# Patient Record
Sex: FEMALE | Race: BLACK OR AFRICAN AMERICAN | NOT HISPANIC OR LATINO | URBAN - METROPOLITAN AREA
[De-identification: names, ages, dates, MRNs, and addresses within clinical notes are randomized per-mention and may not be internally consistent; named-entity substitution may affect disease eponyms.]

---

## 2018-10-29 ENCOUNTER — INPATIENT (INPATIENT)
Facility: HOSPITAL | Age: 78
LOS: 3 days | Discharge: HOME | End: 2018-11-02
Attending: INTERNAL MEDICINE | Admitting: INTERNAL MEDICINE
Payer: COMMERCIAL

## 2018-10-29 VITALS
RESPIRATION RATE: 16 BRPM | DIASTOLIC BLOOD PRESSURE: 88 MMHG | TEMPERATURE: 99 F | OXYGEN SATURATION: 95 % | SYSTOLIC BLOOD PRESSURE: 174 MMHG | HEART RATE: 112 BPM

## 2018-10-29 DIAGNOSIS — Z72.0 TOBACCO USE: ICD-10-CM

## 2018-10-29 DIAGNOSIS — J44.9 CHRONIC OBSTRUCTIVE PULMONARY DISEASE, UNSPECIFIED: ICD-10-CM

## 2018-10-29 DIAGNOSIS — I16.0 HYPERTENSIVE URGENCY: ICD-10-CM

## 2018-10-29 DIAGNOSIS — Z29.9 ENCOUNTER FOR PROPHYLACTIC MEASURES, UNSPECIFIED: ICD-10-CM

## 2018-10-29 DIAGNOSIS — J18.9 PNEUMONIA, UNSPECIFIED ORGANISM: ICD-10-CM

## 2018-10-29 DIAGNOSIS — I50.9 HEART FAILURE, UNSPECIFIED: ICD-10-CM

## 2018-10-29 DIAGNOSIS — I50.21 ACUTE SYSTOLIC (CONGESTIVE) HEART FAILURE: ICD-10-CM

## 2018-10-29 LAB
ALBUMIN SERPL ELPH-MCNC: 3.2 G/DL — LOW (ref 3.3–5)
ALP SERPL-CCNC: 54 U/L — SIGNIFICANT CHANGE UP (ref 40–120)
ALT FLD-CCNC: 13 U/L — SIGNIFICANT CHANGE UP (ref 4–33)
AST SERPL-CCNC: 31 U/L — SIGNIFICANT CHANGE UP (ref 4–32)
BASOPHILS # BLD AUTO: 0.03 K/UL — SIGNIFICANT CHANGE UP (ref 0–0.2)
BASOPHILS NFR BLD AUTO: 0.5 % — SIGNIFICANT CHANGE UP (ref 0–2)
BILIRUB SERPL-MCNC: 0.3 MG/DL — SIGNIFICANT CHANGE UP (ref 0.2–1.2)
BUN SERPL-MCNC: 14 MG/DL — SIGNIFICANT CHANGE UP (ref 7–23)
CALCIUM SERPL-MCNC: 9.2 MG/DL — SIGNIFICANT CHANGE UP (ref 8.4–10.5)
CHLORIDE SERPL-SCNC: 92 MMOL/L — LOW (ref 98–107)
CO2 SERPL-SCNC: 32 MMOL/L — HIGH (ref 22–31)
CREAT SERPL-MCNC: 0.54 MG/DL — SIGNIFICANT CHANGE UP (ref 0.5–1.3)
EOSINOPHIL # BLD AUTO: 0.02 K/UL — SIGNIFICANT CHANGE UP (ref 0–0.5)
EOSINOPHIL NFR BLD AUTO: 0.3 % — SIGNIFICANT CHANGE UP (ref 0–6)
GLUCOSE SERPL-MCNC: 86 MG/DL — SIGNIFICANT CHANGE UP (ref 70–99)
HCT VFR BLD CALC: 39.1 % — SIGNIFICANT CHANGE UP (ref 34.5–45)
HGB BLD-MCNC: 12.5 G/DL — SIGNIFICANT CHANGE UP (ref 11.5–15.5)
IMM GRANULOCYTES # BLD AUTO: 0.02 # — SIGNIFICANT CHANGE UP
IMM GRANULOCYTES NFR BLD AUTO: 0.3 % — SIGNIFICANT CHANGE UP (ref 0–1.5)
LYMPHOCYTES # BLD AUTO: 1.01 K/UL — SIGNIFICANT CHANGE UP (ref 1–3.3)
LYMPHOCYTES # BLD AUTO: 16.5 % — SIGNIFICANT CHANGE UP (ref 13–44)
MCHC RBC-ENTMCNC: 29.3 PG — SIGNIFICANT CHANGE UP (ref 27–34)
MCHC RBC-ENTMCNC: 32 % — SIGNIFICANT CHANGE UP (ref 32–36)
MCV RBC AUTO: 91.6 FL — SIGNIFICANT CHANGE UP (ref 80–100)
MONOCYTES # BLD AUTO: 0.46 K/UL — SIGNIFICANT CHANGE UP (ref 0–0.9)
MONOCYTES NFR BLD AUTO: 7.5 % — SIGNIFICANT CHANGE UP (ref 2–14)
NEUTROPHILS # BLD AUTO: 4.58 K/UL — SIGNIFICANT CHANGE UP (ref 1.8–7.4)
NEUTROPHILS NFR BLD AUTO: 74.9 % — SIGNIFICANT CHANGE UP (ref 43–77)
NRBC # FLD: 0 — SIGNIFICANT CHANGE UP
NT-PROBNP SERPL-SCNC: 2293 PG/ML — SIGNIFICANT CHANGE UP
PLATELET # BLD AUTO: 222 K/UL — SIGNIFICANT CHANGE UP (ref 150–400)
PMV BLD: 9.5 FL — SIGNIFICANT CHANGE UP (ref 7–13)
POTASSIUM SERPL-MCNC: 5.3 MMOL/L — SIGNIFICANT CHANGE UP (ref 3.5–5.3)
POTASSIUM SERPL-SCNC: 5.3 MMOL/L — SIGNIFICANT CHANGE UP (ref 3.5–5.3)
PROT SERPL-MCNC: 6.9 G/DL — SIGNIFICANT CHANGE UP (ref 6–8.3)
RBC # BLD: 4.27 M/UL — SIGNIFICANT CHANGE UP (ref 3.8–5.2)
RBC # FLD: 14.4 % — SIGNIFICANT CHANGE UP (ref 10.3–14.5)
SODIUM SERPL-SCNC: 133 MMOL/L — LOW (ref 135–145)
TROPONIN T, HIGH SENSITIVITY: 21 NG/L — SIGNIFICANT CHANGE UP (ref ?–14)
TROPONIN T, HIGH SENSITIVITY: 24 NG/L — SIGNIFICANT CHANGE UP (ref ?–14)
WBC # BLD: 6.12 K/UL — SIGNIFICANT CHANGE UP (ref 3.8–10.5)
WBC # FLD AUTO: 6.12 K/UL — SIGNIFICANT CHANGE UP (ref 3.8–10.5)

## 2018-10-29 PROCEDURE — 93970 EXTREMITY STUDY: CPT | Mod: 26

## 2018-10-29 PROCEDURE — 71046 X-RAY EXAM CHEST 2 VIEWS: CPT | Mod: 26

## 2018-10-29 PROCEDURE — 99223 1ST HOSP IP/OBS HIGH 75: CPT

## 2018-10-29 PROCEDURE — 93306 TTE W/DOPPLER COMPLETE: CPT | Mod: 26

## 2018-10-29 PROCEDURE — 71275 CT ANGIOGRAPHY CHEST: CPT | Mod: 26

## 2018-10-29 RX ORDER — BUDESONIDE AND FORMOTEROL FUMARATE DIHYDRATE 160; 4.5 UG/1; UG/1
2 AEROSOL RESPIRATORY (INHALATION)
Qty: 0 | Refills: 0 | Status: DISCONTINUED | OUTPATIENT
Start: 2018-10-29 | End: 2018-11-02

## 2018-10-29 RX ORDER — HYDRALAZINE HCL 50 MG
25 TABLET ORAL EVERY 8 HOURS
Qty: 0 | Refills: 0 | Status: DISCONTINUED | OUTPATIENT
Start: 2018-10-29 | End: 2018-11-02

## 2018-10-29 RX ORDER — CEFTRIAXONE 500 MG/1
1 INJECTION, POWDER, FOR SOLUTION INTRAMUSCULAR; INTRAVENOUS EVERY 24 HOURS
Qty: 0 | Refills: 0 | Status: DISCONTINUED | OUTPATIENT
Start: 2018-10-30 | End: 2018-11-02

## 2018-10-29 RX ORDER — LEVALBUTEROL 1.25 MG/.5ML
0.63 SOLUTION, CONCENTRATE RESPIRATORY (INHALATION) EVERY 6 HOURS
Qty: 0 | Refills: 0 | Status: DISCONTINUED | OUTPATIENT
Start: 2018-10-29 | End: 2018-11-02

## 2018-10-29 RX ORDER — CEFTRIAXONE 500 MG/1
1 INJECTION, POWDER, FOR SOLUTION INTRAMUSCULAR; INTRAVENOUS ONCE
Qty: 0 | Refills: 0 | Status: COMPLETED | OUTPATIENT
Start: 2018-10-29 | End: 2018-10-29

## 2018-10-29 RX ORDER — LISINOPRIL 2.5 MG/1
2.5 TABLET ORAL DAILY
Qty: 0 | Refills: 0 | Status: DISCONTINUED | OUTPATIENT
Start: 2018-10-29 | End: 2018-10-29

## 2018-10-29 RX ORDER — FUROSEMIDE 40 MG
40 TABLET ORAL EVERY 12 HOURS
Qty: 0 | Refills: 0 | Status: DISCONTINUED | OUTPATIENT
Start: 2018-10-29 | End: 2018-10-31

## 2018-10-29 RX ORDER — ENOXAPARIN SODIUM 100 MG/ML
40 INJECTION SUBCUTANEOUS EVERY 24 HOURS
Qty: 0 | Refills: 0 | Status: DISCONTINUED | OUTPATIENT
Start: 2018-10-29 | End: 2018-11-02

## 2018-10-29 RX ORDER — AZITHROMYCIN 500 MG/1
500 TABLET, FILM COATED ORAL ONCE
Qty: 0 | Refills: 0 | Status: COMPLETED | OUTPATIENT
Start: 2018-10-29 | End: 2018-10-29

## 2018-10-29 RX ORDER — FUROSEMIDE 40 MG
40 TABLET ORAL ONCE
Qty: 0 | Refills: 0 | Status: COMPLETED | OUTPATIENT
Start: 2018-10-29 | End: 2018-10-29

## 2018-10-29 RX ORDER — LANOLIN ALCOHOL/MO/W.PET/CERES
3 CREAM (GRAM) TOPICAL ONCE
Qty: 0 | Refills: 0 | Status: COMPLETED | OUTPATIENT
Start: 2018-10-29 | End: 2018-10-29

## 2018-10-29 RX ORDER — ASPIRIN/CALCIUM CARB/MAGNESIUM 324 MG
81 TABLET ORAL DAILY
Qty: 0 | Refills: 0 | Status: DISCONTINUED | OUTPATIENT
Start: 2018-10-29 | End: 2018-11-02

## 2018-10-29 RX ORDER — AZITHROMYCIN 500 MG/1
500 TABLET, FILM COATED ORAL EVERY 24 HOURS
Qty: 0 | Refills: 0 | Status: DISCONTINUED | OUTPATIENT
Start: 2018-10-30 | End: 2018-11-01

## 2018-10-29 RX ORDER — ALBUTEROL 90 UG/1
1.25 AEROSOL, METERED ORAL EVERY 6 HOURS
Qty: 0 | Refills: 0 | Status: DISCONTINUED | OUTPATIENT
Start: 2018-10-29 | End: 2018-10-29

## 2018-10-29 RX ORDER — CEFTRIAXONE 500 MG/1
INJECTION, POWDER, FOR SOLUTION INTRAMUSCULAR; INTRAVENOUS
Qty: 0 | Refills: 0 | Status: DISCONTINUED | OUTPATIENT
Start: 2018-10-29 | End: 2018-11-02

## 2018-10-29 RX ORDER — AZITHROMYCIN 500 MG/1
TABLET, FILM COATED ORAL
Qty: 0 | Refills: 0 | Status: DISCONTINUED | OUTPATIENT
Start: 2018-10-29 | End: 2018-11-01

## 2018-10-29 RX ORDER — LISINOPRIL 2.5 MG/1
10 TABLET ORAL DAILY
Qty: 0 | Refills: 0 | Status: DISCONTINUED | OUTPATIENT
Start: 2018-10-29 | End: 2018-11-02

## 2018-10-29 RX ORDER — HYDRALAZINE HCL 50 MG
5 TABLET ORAL ONCE
Qty: 0 | Refills: 0 | Status: COMPLETED | OUTPATIENT
Start: 2018-10-29 | End: 2018-10-29

## 2018-10-29 RX ORDER — NICOTINE POLACRILEX 2 MG
1 GUM BUCCAL DAILY
Qty: 0 | Refills: 0 | Status: DISCONTINUED | OUTPATIENT
Start: 2018-10-29 | End: 2018-11-02

## 2018-10-29 RX ORDER — TIOTROPIUM BROMIDE 18 UG/1
1 CAPSULE ORAL; RESPIRATORY (INHALATION) DAILY
Qty: 0 | Refills: 0 | Status: DISCONTINUED | OUTPATIENT
Start: 2018-10-29 | End: 2018-11-02

## 2018-10-29 RX ADMIN — Medication 25 MILLIGRAM(S): at 12:04

## 2018-10-29 RX ADMIN — BUDESONIDE AND FORMOTEROL FUMARATE DIHYDRATE 2 PUFF(S): 160; 4.5 AEROSOL RESPIRATORY (INHALATION) at 21:59

## 2018-10-29 RX ADMIN — Medication 1 PATCH: at 12:51

## 2018-10-29 RX ADMIN — Medication 5 MILLIGRAM(S): at 07:16

## 2018-10-29 RX ADMIN — CEFTRIAXONE 100 GRAM(S): 500 INJECTION, POWDER, FOR SOLUTION INTRAMUSCULAR; INTRAVENOUS at 19:51

## 2018-10-29 RX ADMIN — ENOXAPARIN SODIUM 40 MILLIGRAM(S): 100 INJECTION SUBCUTANEOUS at 18:21

## 2018-10-29 RX ADMIN — AZITHROMYCIN 250 MILLIGRAM(S): 500 TABLET, FILM COATED ORAL at 18:21

## 2018-10-29 RX ADMIN — Medication 25 MILLIGRAM(S): at 21:58

## 2018-10-29 RX ADMIN — Medication 40 MILLIGRAM(S): at 04:01

## 2018-10-29 RX ADMIN — Medication 3 MILLIGRAM(S): at 21:58

## 2018-10-29 RX ADMIN — LISINOPRIL 10 MILLIGRAM(S): 2.5 TABLET ORAL at 14:48

## 2018-10-29 RX ADMIN — Medication 81 MILLIGRAM(S): at 12:51

## 2018-10-29 RX ADMIN — Medication 40 MILLIGRAM(S): at 18:22

## 2018-10-29 RX ADMIN — Medication 1 PATCH: at 19:22

## 2018-10-29 NOTE — H&P ADULT - HISTORY OF PRESENT ILLNESS
77 y/o female with a PMHx of tobacco abuse but otherwise no significant PMHx presents to ED with dyspnea on exertion for the past several days. Pt reports worsening shortness of breath on exertion for several days, progressing to the point where she is short of breath on minimal exertion. Pt describes it as feeling like she is breathing heavy and can't catch her breath. Pt reports that her shortness of breath is better with rest. Pt also endorses new onset paroxysmal nocturnal dyspnea, orthopnea and lower extremity edema. Pt has been coughing for a couple of days, which is described as a dry, non-productive cough. Pt has been sleeping upright recently because she feels short of breath laying down. Since her   last year of lung cancer, she has decreased appetite and has lost weight, but she cannot quantify. She has over 50 pack year history of smoking and she quit 2 days ago. Pt does not recall a previous cardiac workup. Pt denies fever, chills, recent travel, headache, dizziness, visual deficits, chest pain, palpitations, abdominal pain, N/V/D/C, hematochezia, melena, dysuria, hematuria, LOC, syncope. Upon arrival to ED, EKG: Sinus tachycardia at 106 bpm with PVCs. CE x1: Trop 21. Na: 133. ProBNP: 2293. Pt was given Lasix and admitted to telemetry.

## 2018-10-29 NOTE — CONSULT NOTE ADULT - ASSESSMENT
79 y/o female with a PMHx of tobacco abuse but otherwise no significant PMHx presents to ED with dyspnea on exertion, orthopnea, and lower extremity edema in the setting of acute systolic heart failure.

## 2018-10-29 NOTE — ED PROVIDER NOTE - ATTENDING CONTRIBUTION TO CARE
Attending Note (Annie): patient complaining of shortness of breath and leg swelling. reports sleeping at increased incline.  3+pitting edema to tibial tuberosity.  +rales bilateral bases.  concern for new onset chf. labs, cxr, ekg.

## 2018-10-29 NOTE — H&P ADULT - NSHPLABSRESULTS_GEN_ALL_CORE
EKG: Sinus tachycardia at 106 bpm with PVCs  CE x1: Trop 21  Na: 133  ProBNP: 2293    10/29 Echo: EF 45%. Mitral annular calcification, thickened mitral valve leaflets. Mild-moderate mitral regurgitation. Calcified trileaflet aortic valve with normal opening. Mildly dilated left atrium. LA volume index = 36 cc/m2. Severe concentric left ventricular hypertrophy. Moderate segmental left ventricular systolic dysfunction. The inferior and inferolateral walls are hypokinetic. Normal right ventricular size and function. Normal tricuspid valve. Mild tricuspid regurgitation. Estimated pulmonary artery systolic pressure equals 64 mm Hg, assuming right atrial pressure equals 10 mm Hg, consistent with severe pulmonary hypertension.

## 2018-10-29 NOTE — H&P ADULT - NEGATIVE GASTROINTESTINAL SYMPTOMS
no change in bowel habits/no constipation/no diarrhea/no vomiting/no flatulence/no abdominal pain/no nausea/no hematochezia/no melena

## 2018-10-29 NOTE — ED ADULT TRIAGE NOTE - CHIEF COMPLAINT QUOTE
Appears comfortable and in no apparent distress.  Experiencing SOB and lightheadedness with activity for several days, worst day.  No chest pain, weakness or fever.  Respirations equal and unlabored, speaking in full sentences.

## 2018-10-29 NOTE — ED PROVIDER NOTE - MEDICAL DECISION MAKING DETAILS
77 yo F presenting with increasing CARTER, orthopnea and b/l leg swelling, likely 2/2 CHF, no hx dx- cbc, cmp, trop, ekg, bnp, lasix, tba for new dx CHF

## 2018-10-29 NOTE — H&P ADULT - NEGATIVE NEUROLOGICAL SYMPTOMS
no tremors/no vertigo/no loss of sensation/no syncope/no headache/no difficulty walking/no paresthesias/no confusion/no focal seizures/no transient paralysis/no hemiparesis/no loss of consciousness/no weakness/no generalized seizures

## 2018-10-29 NOTE — CONSULT NOTE ADULT - SUBJECTIVE AND OBJECTIVE BOX
Patient is a 78y old  Female who presents with a chief complaint of Shortness of breath (29 Oct 2018 11:45)      HPI:  79 y/o female with a PMHx of tobacco abuse but otherwise no significant PMHx presents to ED with dyspnea on exertion for the past several days. Pt reports worsening shortness of breath on exertion for several days, progressing to the point where she is short of breath on minimal exertion. Pt describes it as feeling like she is breathing heavy and can't catch her breath. Pt reports that her shortness of breath is better with rest. Pt also endorses new onset paroxysmal nocturnal dyspnea, orthopnea and lower extremity edema. Pt has been coughing for a couple of days, which is described as a dry, non-productive cough. Pt has been sleeping upright recently because she feels short of breath laying down. Since her   last year of lung cancer, she has decreased appetite and has lost weight, but she cannot quantify. She has over 50 pack year history of smoking and she quit 2 days ago. Pt does not recall a previous cardiac workup. Pt denies fever, chills, recent travel, headache, dizziness, visual deficits, chest pain, palpitations, abdominal pain, N/V/D/C, hematochezia, melena, dysuria, hematuria, LOC, syncope. Upon arrival to ED, EKG: Sinus tachycardia at 106 bpm with PVCs. CE x1: Trop 21. Na: 133. ProBNP: 2293. Pt was given Lasix and admitted to telemetry. (29 Oct 2018 11:45)    she smoked for more then 50 years and quit day before yesterday She has also lost significant amount of weight in last many months as she was under lot of stress secondary to her husbands illness: Currently she is not SOB but gets dyspneic on walking      ?FOLLOWING PRESENT  [ x] Hx of PE/DVT, [ x] Hx COPD, [ x] Hx of Asthma, [x ] Hx of Hospitalization, [x ]  Hx of BiPAP/CPAP use, x ] Hx of KATJA    Allergies    No Known Allergies    Intolerances        PAST MEDICAL & SURGICAL HISTORY:  Tobacco abuse  No significant past surgical history      FAMILY HISTORY:  No pertinent family history in first degree relatives      Social History: [> 50 years: 50 pk quit 2 dyas ago  ] TOBACCO                  [ x ] ETOH                                 [x  ] IVDA/DRUGS    REVIEW OF SYSTEMS      General:	x    Skin/Breast:x  	  Ophthalmologic:x  	  ENMT:	x    Respiratory and Thorax: sob, lal, cough   	  Cardiovascular:	x    Gastrointestinal:	x    Genitourinary:	x    Musculoskeletal:	x    Neurological:	x    Psychiatric:	x    Hematology/Lymphatics:	x    Endocrine:	x    Allergic/Immunologic:	    MEDICATIONS  (STANDING):  aspirin enteric coated 81 milliGRAM(s) Oral daily  azithromycin  IVPB      azithromycin  IVPB 500 milliGRAM(s) IV Intermittent once  cefTRIAXone   IVPB 1 Gram(s) IV Intermittent once  cefTRIAXone   IVPB      enoxaparin Injectable 40 milliGRAM(s) SubCutaneous every 24 hours  furosemide   Injectable 40 milliGRAM(s) IV Push every 12 hours  hydrALAZINE 25 milliGRAM(s) Oral every 8 hours  lisinopril 10 milliGRAM(s) Oral daily  nicotine -  14 mG/24Hr(s) Patch 1 patch Transdermal daily    MEDICATIONS  (PRN):  levalbuterol Inhalation 0.63 milliGRAM(s) Inhalation every 6 hours PRN wheezness/short of breath       Vital Signs Last 24 Hrs  T(C): 36.6 (29 Oct 2018 12:01), Max: 37.2 (29 Oct 2018 08:43)  T(F): 97.9 (29 Oct 2018 12:01), Max: 98.9 (29 Oct 2018 08:43)  HR: 103 (29 Oct 2018 14:47) (68 - 112)  BP: 152/71 (29 Oct 2018 14:47) (152/71 - 225/98)  BP(mean): --  RR: 18 (29 Oct 2018 12:01) (16 - 18)  SpO2: 95% (29 Oct 2018 12:01) (95% - 100%)        I&O's Summary      Physical Exam:   GENERAL: NAD, well-groomed, well-developed  HEENT: BALTA/   Atraumatic, Normocephalic  ENMT: No tonsillar erythema, exudates, or enlargement; Moist mucous membranes, Good dentition, No lesions  NECK: Supple, No JVD, Normal thyroid  CHEST/LUNG: Poor air entry bilaterally  CVS: Regular rate and rhythm; No murmurs, rubs, or gallops  GI: : Soft, Nontender, Nondistended; Bowel sounds present  NERVOUS SYSTEM:  Alert & Oriented X3  EXTREMITIES:  2+ edema  LYMPH: No lymphadenopathy noted  SKIN: No rashes or lesions  ENDOCRINOLOGY: No Thyromegaly  PSYCH: Appropriate    Labs:                              12.5   6.12  )-----------( 222      ( 29 Oct 2018 02:22 )             39.1     10-29    133<L>  |  92<L>  |  14  ----------------------------<  86  5.3   |  32<H>  |  0.54    Ca    9.2      29 Oct 2018 02:22    TPro  6.9  /  Alb  3.2<L>  /  TBili  0.3  /  DBili  x   /  AST  31  /  ALT  13  /  AlkPhos  54  10-29    CAPILLARY BLOOD GLUCOSE        LIVER FUNCTIONS - ( 29 Oct 2018 02:22 )  Alb: 3.2 g/dL / Pro: 6.9 g/dL / ALK PHOS: 54 u/L / ALT: 13 u/L / AST: 31 u/L / GGT: x               D DImer  Serum Pro-Brain Natriuretic Peptide: 2293 pg/mL (10-29 @ 02:22)      Studies  Chest X-RAY  CT SCAN Chest   CT Abdomen  Venous Dopplers: LE:   Others    < from: CT Angio Chest w/ IV Cont (10.29.18 @ 12:26) >  edema. There is mild dilatation of the esophagus.    Evaluation of the upper abdominal organs demonstrate subcentimeter   hepatic hypodensities which are too small to characterize.    Evaluation of the lung parenchyma demonstrate emphysema. There are left   lower lobe areas of linear or subsegmental atelectasis. There are patchy   and nodular consolidations within the right lower lobe most confluent at   the right lung base. There is a cluster of tree-in-bud opacities or 2 mm   nodules within the central aspect of the right middle lobe. There are   secretions within the trachea. There is complete opacification of the   lower portion of the bronchus intermedius related to secretions and/or   debris extending into the right lower lobe bronchus and some of its   segmental and subsegmental branches. There is contiguous complete   opacification of the proximal segment of the right middle lobe bronchus.   Secretions and or debris are also noted within the left main stem   bronchus.    Evaluation of the bone demonstrate degenerative changes of the spine and   the shoulders.    IMPRESSION: Right lung base nodular and patchy consolidations likely   infectious in etiology. Opacification of the right lower lung central   airways as described above related to secretions and or debris. The   constellation of findings may represent aspiration pneumonia given the   distribution. A 1-3 month follow-up CT is recommended to ensure   resolution.    Emphysema.    Cardiomegaly possibly associated with left ventricular hypertrophy.   Coronary artery atherosclerotic disease.                  PERLA CHAND M.D. ATTENDING RADIOLOGIST  This document has been electronically signed. Oct 29 2018 12:44PM    < end of copied text >

## 2018-10-29 NOTE — ED PROVIDER NOTE - OBJECTIVE STATEMENT
79 yo F, everyday smoker who quit one day ago, denies past medical history/home meds, presenting to ED for complaints of increasing CARTER and bilateral leg swelling. No associated chest pain with exertion. States she could not walk a block today without feeling short of breath. Does not have a cardiologist, and cannot recall ever having an echocardiogram. Denies using water pill at home.

## 2018-10-29 NOTE — ED PROVIDER NOTE - PHYSICAL EXAMINATION
VITALS: reviewed  GEN: NAD, cachectic, A & O x 4  HEAD/EYES: NCAT, PERRL, EOMI, anicteric sclerae, no conjunctival pallor  ENT: mucus membranes moist, oropharynx WNL, trachea midline, no JVD  RESP: lungs CTA with equal breath sounds bilaterally, chest wall nontender and atraumatic  CV: heart with reg rhythm S1, S2; distal pulses intact and symmetric bilaterally, bilateral pitting edema 3+ to calves  ABDOMEN: normoactive bowel sounds, soft, nondistended, nontender, no palpable masses  : no CVAT  MSK: extremities atraumatic and nontender, no asymmetry. the back is without midline or lateral tenderness, there is no spinal deformity or stepoff and the back is ranged painlessly. the neck has no midline tenderness, deformity, or stepoff, and is ranged painlessly.  SKIN: warm, dry, no rash, no bruising, no cyanosis. color appropriate for ethnicity  NEURO: alert, mentating appropriately, no facial asymmetry. gross sensation, motor, coordination are intact  PSYCH: Affect appropriate

## 2018-10-29 NOTE — H&P ADULT - NEUROLOGICAL DETAILS
responds to pain/sensation intact/normal strength/responds to verbal commands/cranial nerves intact/alert and oriented x 3

## 2018-10-29 NOTE — ED PROVIDER NOTE - NS ED ROS FT
CONST: no fevers, no chills, no trauma  EYES: no pain, no visual disturbances  ENT: no sore throat, no epistaxis, no rhinorrhea, no hearing changes  CV: no chest pain, no palpitations, + orthopnea, no extremity pain, + swelling  RESP: + shortness of breath, + cough, no sputum, no pleurisy, no wheezing  ABD: no abdominal pain, no nausea, no vomiting, no diarrhea, no black or bloody stool  : no dysuria, no hematuria, no frequency, no urgency  MSK: no back pain, no neck pain, no extremity pain  NEURO: no headache, no sensory disturbances, no focal weakness, no dizziness  HEME: no easy bleeding or bruising  SKIN: no diaphoresis, no rash

## 2018-10-29 NOTE — CONSULT NOTE ADULT - PROBLEM SELECTOR RECOMMENDATION 9
Cont antibiotics: check urinary legionella: And secretions noted in the bronchi: This must be followed as she has high risk for cancer given her weight loss and extensive history of smoking:

## 2018-10-29 NOTE — ED ADULT NURSE NOTE - NSIMPLEMENTINTERV_GEN_ALL_ED
Implemented All Universal Safety Interventions:  [place] to call system. Call bell, personal items and telephone within reach. Instruct patient to call for assistance. Room bathroom lighting operational. Non-slip footwear when patient is off stretcher. Physically safe environment: no spills, clutter or unnecessary equipment. Stretcher in lowest position, wheels locked, appropriate side rails in place.

## 2018-10-29 NOTE — H&P ADULT - PROBLEM SELECTOR PLAN 1
Admit to telemetry, serial cardiac enzymes, serial EKGs (hsTrop #2 sent)  Check CBC, CMP, TSH, FLP, HgA1C, BNP  Pt appears hypervolemic on exam  Echo done reveals moderate LV dysfunction with severe pulmonary hypertension  Start Lasix 40mg IVP BID  Start Hydralazine 25mg PO TID and Lisinopril 10mg PO daily  Will eventually like to start beta blocker but not while in decompensated CHF  Given tachycardia, will order CT angio chest to R/O PE and pneumonia    Given new onset CHF, will need ischemic evaluation (cath) when more stable  Strict I&Os, monitor daily weights, 1500 cc fluid restriction, sodium restriction  Dietician consult  PT evaluation for disposition  F/U MD note

## 2018-10-29 NOTE — H&P ADULT - NSHPSOCIALHISTORY_GEN_ALL_CORE
Pt is  as her   last year from lung cancer. Pt admits to smoking 1-1.5 packs of cigarettes for over 50 years. She quit two days ago and is using the patch. Pt denies drinking.

## 2018-10-29 NOTE — H&P ADULT - PROBLEM SELECTOR PLAN 2
Systolic BP over 200, most recent 232/98  Start Hydralazine 25mg PO TID and Lisinopril 2.5mg daily  Add/uptitrate antihypertensive regimen as needed  Monitor BP serially  Sodium restriction

## 2018-10-29 NOTE — ED ADULT NURSE REASSESSMENT NOTE - NS ED NURSE REASSESS COMMENT FT1
Pt awake and alert x 3 ambulates to bathroom co sob placed on 2LNC. vs wnl pt is afebrile. Pt tolerating breakfast awaiting dispo.

## 2018-10-29 NOTE — H&P ADULT - ASSESSMENT
77 y/o female with a PMHx of tobacco abuse but otherwise no significant PMHx presents to ED with dyspnea on exertion, orthopnea, and lower extremity edema in the setting of acute systolic heart failure.

## 2018-10-29 NOTE — CONSULT NOTE ADULT - PROBLEM SELECTOR RECOMMENDATION 2
has copd: quit smoking already: start symbicort with spiriva and albuterol prn: She likely has cor pulmonale: He adm bicarb is high too Check morning ABG:

## 2018-10-29 NOTE — H&P ADULT - NEGATIVE OPHTHALMOLOGIC SYMPTOMS
no photophobia/no diplopia/no pain L/no blurred vision L/no blurred vision R/no loss of vision R/no pain R/no loss of vision L

## 2018-10-29 NOTE — ED ADULT NURSE NOTE - OBJECTIVE STATEMENT
pt received spot 24, alert and orientedx3. c.o increased sob and bilateral leg swelling x1 day. pt had difficulty walking outside today, felt too short of breath. denies cp dizziness nausea. respirations even unlabored. able to speak full sentences. NSR on cm. 20G iv placed left ac, labs sent. will monitor.

## 2018-10-29 NOTE — H&P ADULT - RS GEN PE MLT RESP DETAILS PC
respirations non-labored/diminished breath sounds, R/rales/no chest wall tenderness/airway patent/diminished breath sounds, L

## 2018-10-30 LAB
ALBUMIN SERPL ELPH-MCNC: 3.2 G/DL — LOW (ref 3.3–5)
ALP SERPL-CCNC: 60 U/L — SIGNIFICANT CHANGE UP (ref 40–120)
ALT FLD-CCNC: 9 U/L — SIGNIFICANT CHANGE UP (ref 4–33)
AST SERPL-CCNC: 17 U/L — SIGNIFICANT CHANGE UP (ref 4–32)
BILIRUB SERPL-MCNC: 0.4 MG/DL — SIGNIFICANT CHANGE UP (ref 0.2–1.2)
BUN SERPL-MCNC: 8 MG/DL — SIGNIFICANT CHANGE UP (ref 7–23)
CALCIUM SERPL-MCNC: 9.4 MG/DL — SIGNIFICANT CHANGE UP (ref 8.4–10.5)
CHLORIDE SERPL-SCNC: 89 MMOL/L — LOW (ref 98–107)
CHOLEST SERPL-MCNC: 118 MG/DL — LOW (ref 120–199)
CO2 SERPL-SCNC: 40 MMOL/L — HIGH (ref 22–31)
CREAT SERPL-MCNC: 0.6 MG/DL — SIGNIFICANT CHANGE UP (ref 0.5–1.3)
GLUCOSE SERPL-MCNC: 120 MG/DL — HIGH (ref 70–99)
HBA1C BLD-MCNC: 5.9 % — HIGH (ref 4–5.6)
HCT VFR BLD CALC: 38.7 % — SIGNIFICANT CHANGE UP (ref 34.5–45)
HDLC SERPL-MCNC: 48 MG/DL — SIGNIFICANT CHANGE UP (ref 45–65)
HGB BLD-MCNC: 12.5 G/DL — SIGNIFICANT CHANGE UP (ref 11.5–15.5)
LIPID PNL WITH DIRECT LDL SERPL: 74 MG/DL — SIGNIFICANT CHANGE UP
MAGNESIUM SERPL-MCNC: 1.7 MG/DL — SIGNIFICANT CHANGE UP (ref 1.6–2.6)
MCHC RBC-ENTMCNC: 29 PG — SIGNIFICANT CHANGE UP (ref 27–34)
MCHC RBC-ENTMCNC: 32.3 % — SIGNIFICANT CHANGE UP (ref 32–36)
MCV RBC AUTO: 89.8 FL — SIGNIFICANT CHANGE UP (ref 80–100)
NRBC # FLD: 0 — SIGNIFICANT CHANGE UP
PHOSPHATE SERPL-MCNC: 3.9 MG/DL — SIGNIFICANT CHANGE UP (ref 2.5–4.5)
PLATELET # BLD AUTO: 207 K/UL — SIGNIFICANT CHANGE UP (ref 150–400)
PMV BLD: 9.6 FL — SIGNIFICANT CHANGE UP (ref 7–13)
POTASSIUM SERPL-MCNC: 3.5 MMOL/L — SIGNIFICANT CHANGE UP (ref 3.5–5.3)
POTASSIUM SERPL-SCNC: 3.5 MMOL/L — SIGNIFICANT CHANGE UP (ref 3.5–5.3)
PROT SERPL-MCNC: 6.8 G/DL — SIGNIFICANT CHANGE UP (ref 6–8.3)
RBC # BLD: 4.31 M/UL — SIGNIFICANT CHANGE UP (ref 3.8–5.2)
RBC # FLD: 14.2 % — SIGNIFICANT CHANGE UP (ref 10.3–14.5)
SODIUM SERPL-SCNC: 136 MMOL/L — SIGNIFICANT CHANGE UP (ref 135–145)
SPECIMEN SOURCE: SIGNIFICANT CHANGE UP
SPECIMEN SOURCE: SIGNIFICANT CHANGE UP
TRIGL SERPL-MCNC: 26 MG/DL — SIGNIFICANT CHANGE UP (ref 10–149)
TSH SERPL-MCNC: 1.11 UIU/ML — SIGNIFICANT CHANGE UP (ref 0.27–4.2)
WBC # BLD: 7.53 K/UL — SIGNIFICANT CHANGE UP (ref 3.8–10.5)
WBC # FLD AUTO: 7.53 K/UL — SIGNIFICANT CHANGE UP (ref 3.8–10.5)

## 2018-10-30 PROCEDURE — 99233 SBSQ HOSP IP/OBS HIGH 50: CPT

## 2018-10-30 RX ORDER — POTASSIUM CHLORIDE 20 MEQ
40 PACKET (EA) ORAL ONCE
Qty: 0 | Refills: 0 | Status: COMPLETED | OUTPATIENT
Start: 2018-10-30 | End: 2018-10-30

## 2018-10-30 RX ORDER — LANOLIN ALCOHOL/MO/W.PET/CERES
3 CREAM (GRAM) TOPICAL AT BEDTIME
Qty: 0 | Refills: 0 | Status: DISCONTINUED | OUTPATIENT
Start: 2018-10-30 | End: 2018-11-02

## 2018-10-30 RX ADMIN — Medication 25 MILLIGRAM(S): at 21:18

## 2018-10-30 RX ADMIN — AZITHROMYCIN 250 MILLIGRAM(S): 500 TABLET, FILM COATED ORAL at 17:21

## 2018-10-30 RX ADMIN — Medication 40 MILLIEQUIVALENT(S): at 11:41

## 2018-10-30 RX ADMIN — Medication 1 PATCH: at 18:10

## 2018-10-30 RX ADMIN — ENOXAPARIN SODIUM 40 MILLIGRAM(S): 100 INJECTION SUBCUTANEOUS at 17:21

## 2018-10-30 RX ADMIN — Medication 1 PATCH: at 11:42

## 2018-10-30 RX ADMIN — BUDESONIDE AND FORMOTEROL FUMARATE DIHYDRATE 2 PUFF(S): 160; 4.5 AEROSOL RESPIRATORY (INHALATION) at 09:37

## 2018-10-30 RX ADMIN — Medication 40 MILLIGRAM(S): at 05:06

## 2018-10-30 RX ADMIN — LISINOPRIL 10 MILLIGRAM(S): 2.5 TABLET ORAL at 05:07

## 2018-10-30 RX ADMIN — TIOTROPIUM BROMIDE 1 CAPSULE(S): 18 CAPSULE ORAL; RESPIRATORY (INHALATION) at 09:37

## 2018-10-30 RX ADMIN — Medication 40 MILLIGRAM(S): at 17:17

## 2018-10-30 RX ADMIN — Medication 25 MILLIGRAM(S): at 13:15

## 2018-10-30 RX ADMIN — Medication 81 MILLIGRAM(S): at 11:41

## 2018-10-30 RX ADMIN — LEVALBUTEROL 0.63 MILLIGRAM(S): 1.25 SOLUTION, CONCENTRATE RESPIRATORY (INHALATION) at 01:54

## 2018-10-30 RX ADMIN — BUDESONIDE AND FORMOTEROL FUMARATE DIHYDRATE 2 PUFF(S): 160; 4.5 AEROSOL RESPIRATORY (INHALATION) at 20:47

## 2018-10-30 RX ADMIN — Medication 3 MILLIGRAM(S): at 21:48

## 2018-10-30 RX ADMIN — CEFTRIAXONE 100 GRAM(S): 500 INJECTION, POWDER, FOR SOLUTION INTRAMUSCULAR; INTRAVENOUS at 20:46

## 2018-10-30 RX ADMIN — Medication 25 MILLIGRAM(S): at 05:10

## 2018-10-30 NOTE — PHYSICAL THERAPY INITIAL EVALUATION ADULT - ADDITIONAL COMMENTS
Pt. reports she previously ambulating independently without an assistive device and independent with ADLs. Pt. returned supine in bed with all tubes/lines intact, call bell in reach and in NAD.

## 2018-10-30 NOTE — PHYSICAL THERAPY INITIAL EVALUATION ADULT - DISCHARGE DISPOSITION, PT EVAL
anticipated D/C to home with home PT services to address current functional limitations to optimize safety within the home environment with utilization of a rolling walker/home w/ home PT

## 2018-10-31 LAB
BUN SERPL-MCNC: 8 MG/DL — SIGNIFICANT CHANGE UP (ref 7–23)
CALCIUM SERPL-MCNC: 8.9 MG/DL — SIGNIFICANT CHANGE UP (ref 8.4–10.5)
CHLORIDE SERPL-SCNC: 90 MMOL/L — LOW (ref 98–107)
CO2 SERPL-SCNC: 38 MMOL/L — HIGH (ref 22–31)
CREAT SERPL-MCNC: 0.54 MG/DL — SIGNIFICANT CHANGE UP (ref 0.5–1.3)
GLUCOSE SERPL-MCNC: 95 MG/DL — SIGNIFICANT CHANGE UP (ref 70–99)
HCT VFR BLD CALC: 36.2 % — SIGNIFICANT CHANGE UP (ref 34.5–45)
HGB BLD-MCNC: 11.7 G/DL — SIGNIFICANT CHANGE UP (ref 11.5–15.5)
L PNEUMO AG UR QL: NEGATIVE — SIGNIFICANT CHANGE UP
L PNEUMO AG UR QL: NEGATIVE — SIGNIFICANT CHANGE UP
MCHC RBC-ENTMCNC: 28.5 PG — SIGNIFICANT CHANGE UP (ref 27–34)
MCHC RBC-ENTMCNC: 32.3 % — SIGNIFICANT CHANGE UP (ref 32–36)
MCV RBC AUTO: 88.1 FL — SIGNIFICANT CHANGE UP (ref 80–100)
NRBC # FLD: 0 — SIGNIFICANT CHANGE UP
PLATELET # BLD AUTO: 211 K/UL — SIGNIFICANT CHANGE UP (ref 150–400)
PMV BLD: 9.7 FL — SIGNIFICANT CHANGE UP (ref 7–13)
POTASSIUM SERPL-MCNC: 3.6 MMOL/L — SIGNIFICANT CHANGE UP (ref 3.5–5.3)
POTASSIUM SERPL-SCNC: 3.6 MMOL/L — SIGNIFICANT CHANGE UP (ref 3.5–5.3)
RBC # BLD: 4.11 M/UL — SIGNIFICANT CHANGE UP (ref 3.8–5.2)
RBC # FLD: 14.6 % — HIGH (ref 10.3–14.5)
SODIUM SERPL-SCNC: 136 MMOL/L — SIGNIFICANT CHANGE UP (ref 135–145)
WBC # BLD: 4.65 K/UL — SIGNIFICANT CHANGE UP (ref 3.8–10.5)
WBC # FLD AUTO: 4.65 K/UL — SIGNIFICANT CHANGE UP (ref 3.8–10.5)

## 2018-10-31 PROCEDURE — 99232 SBSQ HOSP IP/OBS MODERATE 35: CPT

## 2018-10-31 RX ORDER — CARVEDILOL PHOSPHATE 80 MG/1
6.25 CAPSULE, EXTENDED RELEASE ORAL EVERY 12 HOURS
Qty: 0 | Refills: 0 | Status: DISCONTINUED | OUTPATIENT
Start: 2018-10-31 | End: 2018-11-02

## 2018-10-31 RX ORDER — FUROSEMIDE 40 MG
40 TABLET ORAL
Qty: 0 | Refills: 0 | Status: DISCONTINUED | OUTPATIENT
Start: 2018-10-31 | End: 2018-10-31

## 2018-10-31 RX ORDER — FUROSEMIDE 40 MG
40 TABLET ORAL
Qty: 0 | Refills: 0 | Status: DISCONTINUED | OUTPATIENT
Start: 2018-10-31 | End: 2018-11-02

## 2018-10-31 RX ADMIN — Medication 25 MILLIGRAM(S): at 14:42

## 2018-10-31 RX ADMIN — Medication 81 MILLIGRAM(S): at 13:18

## 2018-10-31 RX ADMIN — AZITHROMYCIN 250 MILLIGRAM(S): 500 TABLET, FILM COATED ORAL at 14:42

## 2018-10-31 RX ADMIN — Medication 40 MILLIGRAM(S): at 18:38

## 2018-10-31 RX ADMIN — CARVEDILOL PHOSPHATE 6.25 MILLIGRAM(S): 80 CAPSULE, EXTENDED RELEASE ORAL at 18:38

## 2018-10-31 RX ADMIN — BUDESONIDE AND FORMOTEROL FUMARATE DIHYDRATE 2 PUFF(S): 160; 4.5 AEROSOL RESPIRATORY (INHALATION) at 21:52

## 2018-10-31 RX ADMIN — BUDESONIDE AND FORMOTEROL FUMARATE DIHYDRATE 2 PUFF(S): 160; 4.5 AEROSOL RESPIRATORY (INHALATION) at 09:10

## 2018-10-31 RX ADMIN — Medication 1 PATCH: at 05:48

## 2018-10-31 RX ADMIN — Medication 25 MILLIGRAM(S): at 21:52

## 2018-10-31 RX ADMIN — ENOXAPARIN SODIUM 40 MILLIGRAM(S): 100 INJECTION SUBCUTANEOUS at 18:38

## 2018-10-31 RX ADMIN — Medication 1 PATCH: at 13:16

## 2018-10-31 RX ADMIN — Medication 1 PATCH: at 19:22

## 2018-10-31 RX ADMIN — Medication 3 MILLIGRAM(S): at 21:57

## 2018-10-31 RX ADMIN — Medication 1 PATCH: at 13:17

## 2018-10-31 RX ADMIN — CEFTRIAXONE 100 GRAM(S): 500 INJECTION, POWDER, FOR SOLUTION INTRAMUSCULAR; INTRAVENOUS at 20:34

## 2018-10-31 RX ADMIN — Medication 25 MILLIGRAM(S): at 05:48

## 2018-10-31 RX ADMIN — TIOTROPIUM BROMIDE 1 CAPSULE(S): 18 CAPSULE ORAL; RESPIRATORY (INHALATION) at 09:11

## 2018-10-31 RX ADMIN — Medication 40 MILLIGRAM(S): at 05:47

## 2018-10-31 RX ADMIN — LISINOPRIL 10 MILLIGRAM(S): 2.5 TABLET ORAL at 05:47

## 2018-10-31 NOTE — DIETITIAN INITIAL EVALUATION ADULT. - FACTORS AFF FOOD INTAKE
persistent lack of appetite/states she lacks interest in eating since her  has . Pt stated she was recently prescribed an appetite stimulant which helped improve her appetite.

## 2018-10-31 NOTE — DIETITIAN INITIAL EVALUATION ADULT. - PHYSICAL APPEARANCE
Severe muscle loss in temples, clavicles, shoulders, patellar region. Severe fat loss in orbital, buccal, triceps and ribs./emaciated

## 2018-10-31 NOTE — DIETITIAN INITIAL EVALUATION ADULT. - NS AS NUTRI INTERV MEALS SNACK
1. Continue DASH diet. 2. Defer fluids to medical team. 3. Encourage PO intake and honor food preferences as able. 4. Monitor weights, labs, BM's, skin integrity, p.o. intake.

## 2018-10-31 NOTE — DIETITIAN INITIAL EVALUATION ADULT. - PERTINENT MEDS FT
MEDICATIONS  (STANDING):  aspirin enteric coated 81 milliGRAM(s) Oral daily  azithromycin  IVPB      azithromycin  IVPB 500 milliGRAM(s) IV Intermittent every 24 hours  buDESOnide 160 MICROgram(s)/formoterol 4.5 MICROgram(s) Inhaler 2 Puff(s) Inhalation two times a day  cefTRIAXone   IVPB 1 Gram(s) IV Intermittent every 24 hours  cefTRIAXone   IVPB      enoxaparin Injectable 40 milliGRAM(s) SubCutaneous every 24 hours  furosemide   Injectable 40 milliGRAM(s) IV Push every 12 hours  hydrALAZINE 25 milliGRAM(s) Oral every 8 hours  lisinopril 10 milliGRAM(s) Oral daily  melatonin 3 milliGRAM(s) Oral at bedtime  nicotine -  14 mG/24Hr(s) Patch 1 patch Transdermal daily  tiotropium 18 MICROgram(s) Capsule 1 Capsule(s) Inhalation daily

## 2018-10-31 NOTE — DIETITIAN INITIAL EVALUATION ADULT. - NS AS NUTRI INTERV MEDICAL AND FOOD SUPPLEMENTS
Ensure Enlive 1x daily (350 fran and 20 gm protein) Please increase Ensure Enlive to 2x daily if fluid restriction is no longer warranted.

## 2018-10-31 NOTE — DIETITIAN INITIAL EVALUATION ADULT. - OTHER INFO
Pt seen for RD consult. Pt is a 79 y/o F admitted with acute heart failure, LE edema, and CARTER. Pt with PMH of tobacco abuse. Pt reports poor appetite in house, <25% meals eaten. No GI distress (nausea/vomiting/diarrhea/constipation.) NKFA. No chewing or swallowing difficulties at this time. Pt states her weight has steadily declined since 2014 when her  got sick with lung ca. Pt states her highest weight was 130 pounds and her lowest recent weight was 94 pounds. After adding Ensure in her diet along with an appetite stimulant, pt's weight increased to current weight 104.2 pounds. However, pt also noted with 2+ B/L LE edema

## 2018-10-31 NOTE — CHART NOTE - NSCHARTNOTEFT_GEN_A_CORE
NUTRITION SERVICES     Upon Nutritional Assessment by the Registered Dietitian your patient was determined to meet criteria/ has evidence of the following diagnosis/diagnoses:  [ ] Mild Protein Calorie Malnutrition   [ ] Moderate Protein Calorie Malnutrition   [ x] Severe Protein Calorie Malnutrition   [ ] Unspecified Protein Calorie Malnutrition   [x ] Underweight / BMI <19  [ ] Morbid Obesity / BMI >40    Findings as based on:  •  Comprehensive nutritional assessment and consultation    Please refer to Initial Dietitian Evaluation via documents section of [facility] EMR for further recommendations.

## 2018-11-01 LAB
BUN SERPL-MCNC: 8 MG/DL — SIGNIFICANT CHANGE UP (ref 7–23)
CALCIUM SERPL-MCNC: 9 MG/DL — SIGNIFICANT CHANGE UP (ref 8.4–10.5)
CHLORIDE SERPL-SCNC: 87 MMOL/L — LOW (ref 98–107)
CO2 SERPL-SCNC: 39 MMOL/L — HIGH (ref 22–31)
CREAT SERPL-MCNC: 0.6 MG/DL — SIGNIFICANT CHANGE UP (ref 0.5–1.3)
GLUCOSE SERPL-MCNC: 82 MG/DL — SIGNIFICANT CHANGE UP (ref 70–99)
HCT VFR BLD CALC: 35.6 % — SIGNIFICANT CHANGE UP (ref 34.5–45)
HGB BLD-MCNC: 11.2 G/DL — LOW (ref 11.5–15.5)
MAGNESIUM SERPL-MCNC: 1.7 MG/DL — SIGNIFICANT CHANGE UP (ref 1.6–2.6)
MCHC RBC-ENTMCNC: 28.9 PG — SIGNIFICANT CHANGE UP (ref 27–34)
MCHC RBC-ENTMCNC: 31.5 % — LOW (ref 32–36)
MCV RBC AUTO: 91.8 FL — SIGNIFICANT CHANGE UP (ref 80–100)
NRBC # FLD: 0 — SIGNIFICANT CHANGE UP
PLATELET # BLD AUTO: 200 K/UL — SIGNIFICANT CHANGE UP (ref 150–400)
PMV BLD: 10.2 FL — SIGNIFICANT CHANGE UP (ref 7–13)
POTASSIUM SERPL-MCNC: 3.8 MMOL/L — SIGNIFICANT CHANGE UP (ref 3.5–5.3)
POTASSIUM SERPL-SCNC: 3.8 MMOL/L — SIGNIFICANT CHANGE UP (ref 3.5–5.3)
RBC # BLD: 3.88 M/UL — SIGNIFICANT CHANGE UP (ref 3.8–5.2)
RBC # FLD: 14.6 % — HIGH (ref 10.3–14.5)
SODIUM SERPL-SCNC: 135 MMOL/L — SIGNIFICANT CHANGE UP (ref 135–145)
WBC # BLD: 4.3 K/UL — SIGNIFICANT CHANGE UP (ref 3.8–10.5)
WBC # FLD AUTO: 4.3 K/UL — SIGNIFICANT CHANGE UP (ref 3.8–10.5)

## 2018-11-01 PROCEDURE — 93010 ELECTROCARDIOGRAM REPORT: CPT

## 2018-11-01 PROCEDURE — 99232 SBSQ HOSP IP/OBS MODERATE 35: CPT

## 2018-11-01 PROCEDURE — 74177 CT ABD & PELVIS W/CONTRAST: CPT | Mod: 26

## 2018-11-01 RX ORDER — IBUPROFEN 200 MG
200 TABLET ORAL
Qty: 0 | Refills: 0 | Status: DISCONTINUED | OUTPATIENT
Start: 2018-11-01 | End: 2018-11-02

## 2018-11-01 RX ORDER — ISOSORBIDE MONONITRATE 60 MG/1
30 TABLET, EXTENDED RELEASE ORAL DAILY
Qty: 0 | Refills: 0 | Status: DISCONTINUED | OUTPATIENT
Start: 2018-11-01 | End: 2018-11-02

## 2018-11-01 RX ADMIN — Medication 25 MILLIGRAM(S): at 22:38

## 2018-11-01 RX ADMIN — TIOTROPIUM BROMIDE 1 CAPSULE(S): 18 CAPSULE ORAL; RESPIRATORY (INHALATION) at 11:59

## 2018-11-01 RX ADMIN — Medication 1 PATCH: at 11:59

## 2018-11-01 RX ADMIN — CEFTRIAXONE 100 GRAM(S): 500 INJECTION, POWDER, FOR SOLUTION INTRAMUSCULAR; INTRAVENOUS at 22:40

## 2018-11-01 RX ADMIN — Medication 40 MILLIGRAM(S): at 06:38

## 2018-11-01 RX ADMIN — BUDESONIDE AND FORMOTEROL FUMARATE DIHYDRATE 2 PUFF(S): 160; 4.5 AEROSOL RESPIRATORY (INHALATION) at 22:39

## 2018-11-01 RX ADMIN — BUDESONIDE AND FORMOTEROL FUMARATE DIHYDRATE 2 PUFF(S): 160; 4.5 AEROSOL RESPIRATORY (INHALATION) at 11:59

## 2018-11-01 RX ADMIN — Medication 25 MILLIGRAM(S): at 06:38

## 2018-11-01 RX ADMIN — LISINOPRIL 10 MILLIGRAM(S): 2.5 TABLET ORAL at 06:37

## 2018-11-01 RX ADMIN — CARVEDILOL PHOSPHATE 6.25 MILLIGRAM(S): 80 CAPSULE, EXTENDED RELEASE ORAL at 06:38

## 2018-11-01 RX ADMIN — Medication 40 MILLIGRAM(S): at 18:04

## 2018-11-01 RX ADMIN — ENOXAPARIN SODIUM 40 MILLIGRAM(S): 100 INJECTION SUBCUTANEOUS at 18:03

## 2018-11-01 RX ADMIN — Medication 3 MILLIGRAM(S): at 22:39

## 2018-11-01 RX ADMIN — Medication 25 MILLIGRAM(S): at 14:42

## 2018-11-01 RX ADMIN — Medication 200 MILLIGRAM(S): at 11:59

## 2018-11-01 RX ADMIN — CARVEDILOL PHOSPHATE 6.25 MILLIGRAM(S): 80 CAPSULE, EXTENDED RELEASE ORAL at 18:04

## 2018-11-01 RX ADMIN — Medication 200 MILLIGRAM(S): at 14:01

## 2018-11-01 RX ADMIN — Medication 1 PATCH: at 18:01

## 2018-11-01 RX ADMIN — Medication 81 MILLIGRAM(S): at 11:59

## 2018-11-02 ENCOUNTER — TRANSCRIPTION ENCOUNTER (OUTPATIENT)
Age: 78
End: 2018-11-02

## 2018-11-02 VITALS — WEIGHT: 97 LBS

## 2018-11-02 LAB
BUN SERPL-MCNC: 14 MG/DL — SIGNIFICANT CHANGE UP (ref 7–23)
CALCIUM SERPL-MCNC: 8.8 MG/DL — SIGNIFICANT CHANGE UP (ref 8.4–10.5)
CHLORIDE SERPL-SCNC: 83 MMOL/L — LOW (ref 98–107)
CO2 SERPL-SCNC: 37 MMOL/L — HIGH (ref 22–31)
CREAT SERPL-MCNC: 0.57 MG/DL — SIGNIFICANT CHANGE UP (ref 0.5–1.3)
GLUCOSE SERPL-MCNC: 85 MG/DL — SIGNIFICANT CHANGE UP (ref 70–99)
HCT VFR BLD CALC: 37.3 % — SIGNIFICANT CHANGE UP (ref 34.5–45)
HGB BLD-MCNC: 12 G/DL — SIGNIFICANT CHANGE UP (ref 11.5–15.5)
MAGNESIUM SERPL-MCNC: 1.7 MG/DL — SIGNIFICANT CHANGE UP (ref 1.6–2.6)
MCHC RBC-ENTMCNC: 29 PG — SIGNIFICANT CHANGE UP (ref 27–34)
MCHC RBC-ENTMCNC: 32.2 % — SIGNIFICANT CHANGE UP (ref 32–36)
MCV RBC AUTO: 90.1 FL — SIGNIFICANT CHANGE UP (ref 80–100)
NRBC # FLD: 0 — SIGNIFICANT CHANGE UP
PLATELET # BLD AUTO: 199 K/UL — SIGNIFICANT CHANGE UP (ref 150–400)
PMV BLD: 10 FL — SIGNIFICANT CHANGE UP (ref 7–13)
POTASSIUM SERPL-MCNC: 3.9 MMOL/L — SIGNIFICANT CHANGE UP (ref 3.5–5.3)
POTASSIUM SERPL-SCNC: 3.9 MMOL/L — SIGNIFICANT CHANGE UP (ref 3.5–5.3)
RBC # BLD: 4.14 M/UL — SIGNIFICANT CHANGE UP (ref 3.8–5.2)
RBC # FLD: 14.3 % — SIGNIFICANT CHANGE UP (ref 10.3–14.5)
SODIUM SERPL-SCNC: 131 MMOL/L — LOW (ref 135–145)
WBC # BLD: 5.43 K/UL — SIGNIFICANT CHANGE UP (ref 3.8–10.5)
WBC # FLD AUTO: 5.43 K/UL — SIGNIFICANT CHANGE UP (ref 3.8–10.5)

## 2018-11-02 PROCEDURE — 99239 HOSP IP/OBS DSCHRG MGMT >30: CPT

## 2018-11-02 RX ORDER — HYDRALAZINE HCL 50 MG
1 TABLET ORAL
Qty: 90 | Refills: 0 | OUTPATIENT
Start: 2018-11-02 | End: 2018-12-01

## 2018-11-02 RX ORDER — ISOSORBIDE MONONITRATE 60 MG/1
1 TABLET, EXTENDED RELEASE ORAL
Qty: 30 | Refills: 0 | OUTPATIENT
Start: 2018-11-02 | End: 2018-12-01

## 2018-11-02 RX ORDER — BUDESONIDE AND FORMOTEROL FUMARATE DIHYDRATE 160; 4.5 UG/1; UG/1
2 AEROSOL RESPIRATORY (INHALATION)
Qty: 1 | Refills: 0 | OUTPATIENT
Start: 2018-11-02 | End: 2018-12-01

## 2018-11-02 RX ORDER — IBUPROFEN 200 MG
1 TABLET ORAL
Qty: 0 | Refills: 0 | COMMUNITY
Start: 2018-11-02

## 2018-11-02 RX ORDER — TIOTROPIUM BROMIDE 18 UG/1
1 CAPSULE ORAL; RESPIRATORY (INHALATION)
Qty: 30 | Refills: 0 | OUTPATIENT
Start: 2018-11-02 | End: 2018-12-01

## 2018-11-02 RX ORDER — NICOTINE POLACRILEX 2 MG
1 GUM BUCCAL
Qty: 30 | Refills: 0 | OUTPATIENT
Start: 2018-11-02 | End: 2018-12-01

## 2018-11-02 RX ORDER — AZITHROMYCIN 500 MG/1
1 TABLET, FILM COATED ORAL
Qty: 3 | Refills: 0 | OUTPATIENT
Start: 2018-11-02 | End: 2018-11-04

## 2018-11-02 RX ORDER — FUROSEMIDE 40 MG
1 TABLET ORAL
Qty: 60 | Refills: 0 | OUTPATIENT
Start: 2018-11-02 | End: 2018-12-01

## 2018-11-02 RX ORDER — CARVEDILOL PHOSPHATE 80 MG/1
1 CAPSULE, EXTENDED RELEASE ORAL
Qty: 60 | Refills: 0 | OUTPATIENT
Start: 2018-11-02 | End: 2018-12-01

## 2018-11-02 RX ORDER — LISINOPRIL 2.5 MG/1
1 TABLET ORAL
Qty: 30 | Refills: 0 | OUTPATIENT
Start: 2018-11-02 | End: 2018-12-01

## 2018-11-02 RX ORDER — LANOLIN ALCOHOL/MO/W.PET/CERES
1 CREAM (GRAM) TOPICAL
Qty: 0 | Refills: 0 | COMMUNITY
Start: 2018-11-02

## 2018-11-02 RX ORDER — ASPIRIN/CALCIUM CARB/MAGNESIUM 324 MG
1 TABLET ORAL
Qty: 0 | Refills: 0 | COMMUNITY
Start: 2018-11-02

## 2018-11-02 RX ADMIN — Medication 81 MILLIGRAM(S): at 11:57

## 2018-11-02 RX ADMIN — Medication 40 MILLIGRAM(S): at 18:26

## 2018-11-02 RX ADMIN — BUDESONIDE AND FORMOTEROL FUMARATE DIHYDRATE 2 PUFF(S): 160; 4.5 AEROSOL RESPIRATORY (INHALATION) at 09:55

## 2018-11-02 RX ADMIN — TIOTROPIUM BROMIDE 1 CAPSULE(S): 18 CAPSULE ORAL; RESPIRATORY (INHALATION) at 09:55

## 2018-11-02 RX ADMIN — Medication 1 PATCH: at 11:58

## 2018-11-02 RX ADMIN — Medication 25 MILLIGRAM(S): at 14:18

## 2018-11-02 RX ADMIN — CARVEDILOL PHOSPHATE 6.25 MILLIGRAM(S): 80 CAPSULE, EXTENDED RELEASE ORAL at 06:08

## 2018-11-02 RX ADMIN — ENOXAPARIN SODIUM 40 MILLIGRAM(S): 100 INJECTION SUBCUTANEOUS at 18:27

## 2018-11-02 RX ADMIN — Medication 1 PATCH: at 06:10

## 2018-11-02 RX ADMIN — CARVEDILOL PHOSPHATE 6.25 MILLIGRAM(S): 80 CAPSULE, EXTENDED RELEASE ORAL at 18:26

## 2018-11-02 RX ADMIN — Medication 1 PATCH: at 11:59

## 2018-11-02 RX ADMIN — Medication 200 MILLIGRAM(S): at 18:26

## 2018-11-02 RX ADMIN — Medication 200 MILLIGRAM(S): at 06:09

## 2018-11-02 RX ADMIN — ISOSORBIDE MONONITRATE 30 MILLIGRAM(S): 60 TABLET, EXTENDED RELEASE ORAL at 11:57

## 2018-11-02 RX ADMIN — Medication 40 MILLIGRAM(S): at 06:08

## 2018-11-02 RX ADMIN — LISINOPRIL 10 MILLIGRAM(S): 2.5 TABLET ORAL at 06:08

## 2018-11-02 RX ADMIN — Medication 200 MILLIGRAM(S): at 06:10

## 2018-11-02 RX ADMIN — Medication 25 MILLIGRAM(S): at 06:08

## 2018-11-02 NOTE — PROGRESS NOTE ADULT - ASSESSMENT
77 y/o female with a PMHx of tobacco abuse but otherwise no significant PMHx presents to ED with dyspnea on exertion, orthopnea, and lower extremity edema in the setting of acute systolic heart failure with superimposed CAP.  Also with significant recent unintentional weight loss.    #Cardiomyopathy/acute systolic CHF  Feels better today  Change to PO Lasix 40 BID, strict I/Os, daily standing weights  Start Coreg 6.25 BID, continue lisinopril  On hydralazine    #PNA:  CTA chest noted no PE, but possible PNA  On Ceftriaxone/azithromycin    #Unintentional weight loss  Will arrange for CT abdomen/pelvis with PO/IV contrast.
77 y/o female with a PMHx of tobacco abuse but otherwise no significant PMHx presents to ED with dyspnea on exertion, orthopnea, and lower extremity edema in the setting of acute systolic heart failure.
77 y/o female with a PMHx of tobacco abuse but otherwise no significant PMHx presents to ED with dyspnea on exertion, orthopnea, and lower extremity edema in the setting of acute systolic heart failure.
79 y/o female with a PMHx of tobacco abuse but otherwise no significant PMHx presents to ED with dyspnea on exertion, orthopnea, and lower extremity edema in the setting of acute systolic heart failure with superimposed CAP.  Also with significant recent unintentional weight loss.    #Cardiomyopathy/acute systolic CHF  Feels better today  Tolerating PO Lasix 40 BID, strict I/Os, daily standing weights  On Coreg 6.25 BID, continue lisinopril  On hydralazine, add Imdur 30    #PNA:  CTA chest noted no PE, but possible PNA  On IV abx --> transition to PO regimen    #Unintentional weight loss  Will arrange for CT abdomen/pelvis with PO/IV contrast.  Can get as outpt    D/C if above workup unremarkable
79 y/o female with a PMHx of tobacco abuse but otherwise no significant PMHx presents to ED with dyspnea on exertion, orthopnea, and lower extremity edema in the setting of acute systolic heart failure.
77 y/o female with a PMHx of tobacco abuse but otherwise no significant PMHx presents to ED with dyspnea on exertion, orthopnea, and lower extremity edema in the setting of acute systolic heart failure.      Significant unintentional weight loss  Acute systolic CHF  Feels better today  Continue Lasix IV, strict I/Os, daily standing weights  CTA chest noted no PE, but possible PNA  On Ceftriaxone/azithromycin  Start Coreg 6.25 BID, continue lisinopril    Will arrange for CT abdomen/pelvis with PO/IV contrast.
77 y/o female with a PMHx of tobacco abuse but otherwise no significant PMHx presents to ED with dyspnea on exertion, orthopnea, and lower extremity edema in the setting of acute systolic heart failure.

## 2018-11-02 NOTE — PROGRESS NOTE ADULT - ATTENDING COMMENTS
legionella is negative: can dc zithromax
legionella is negative: can dc zithromax
legionella is negative: can dc zithromax  11/2: Do speech and swallow

## 2018-11-02 NOTE — PROGRESS NOTE ADULT - PROBLEM SELECTOR PLAN 1
As above
As above
doing better: cont antibiotics: ct scan chest is noted::
doing better: cont antibiotics: ct scan chest is noted::  10/31: pt is doing ok : no SOB : Cont cap treatment!!  11/1: finish 7 days of antibiotics: rpt ct scan chest in 6-8 weeks time
pt will finish 7 days of antibtics: She has been afebrile as wellas with normal WBC count: She has secretions in the rigth lower airways: Recdommned sppech and swallowing evaluation: It is important that this ct scan be repeated in 4 weeks time as she has pretty high risk for malignancy: If she does not get better: She would need bronchoscopy at that time
As above
doing better: cont antibiotics: ct scan chest is noted::  10/31: pt is doing ok : no SOB : Cont cap treatment!!

## 2018-11-02 NOTE — DISCHARGE NOTE ADULT - MEDICATION SUMMARY - MEDICATIONS TO TAKE
I will START or STAY ON the medications listed below when I get home from the hospital:    ibuprofen 200 mg oral tablet  -- 1 tab(s) by mouth 2 times a day  -- Indication: For Pain reliever    aspirin 81 mg oral delayed release tablet  -- 1 tab(s) by mouth once a day  -- Indication: For Heart protector    lisinopril 10 mg oral tablet  -- 1 tab(s) by mouth once a day  -- Indication: For HTN    isosorbide mononitrate 30 mg oral tablet, extended release  -- 1 tab(s) by mouth once a day  -- Indication: For Chest pain    carvedilol 6.25 mg oral tablet  -- 1 tab(s) by mouth every 12 hours  -- Indication: For HTN    budesonide-formoterol 160 mcg-4.5 mcg/inh inhalation aerosol  -- 2 puff(s) inhaled 2 times a day   -- Indication: For Lungs    tiotropium 18 mcg inhalation capsule  -- 1 cap(s) inhaled once a day  -- Indication: For Lungs    furosemide 40 mg oral tablet  -- 1 tab(s) by mouth 2 times a day  -- Indication: For CHF    Zithromax 500 mg oral tablet  -- 1 tab(s) by mouth once a day   -- Do not take dairy products, antacids, or iron preparations within one hour of this medication.  Finish all this medication unless otherwise directed by prescriber.    -- Indication: For Antibiotic    melatonin 3 mg oral tablet  -- 1 tab(s) by mouth once a day (at bedtime)  -- Indication: For sleep aid    nicotine 14 mg/24 hr transdermal film, extended release  -- 1 patch by transdermal patch once a day   -- Indication: For smoke cessation    hydrALAZINE 25 mg oral tablet  -- 1 tab(s) by mouth every 8 hours  -- Indication: For HTN

## 2018-11-02 NOTE — PROGRESS NOTE ADULT - PROBLEM SELECTOR PLAN 3
Pt amenable to smoking cessation  Nicoderm patch ordered  Smoking cessation encouraged
stable: Cont current care:  10/31: Seems compensated!
Pt amenable to smoking cessation  Nicoderm patch ordered  Smoking cessation encouraged
Pt amenable to smoking cessation  Nicoderm patch ordered  Smoking cessation encouraged
seems stable: Compensated!
stable: Cont current care:
stable: Cont current care:  10/31: Seems compensated!  11/1: compensated

## 2018-11-02 NOTE — PROGRESS NOTE ADULT - PROBLEM SELECTOR PROBLEM 1
Acute systolic (congestive) heart failure
Acute systolic (congestive) heart failure
Pneumonia
Acute systolic (congestive) heart failure
Pneumonia

## 2018-11-02 NOTE — DISCHARGE NOTE ADULT - PATIENT PORTAL LINK FT
You can access the [facility] Portal, offered by [facility], by registering with the following website: [email or web address]

## 2018-11-02 NOTE — PROGRESS NOTE ADULT - PROBLEM SELECTOR PROBLEM 3
Acute systolic (congestive) heart failure
Tobacco abuse
Acute systolic (congestive) heart failure

## 2018-11-02 NOTE — DISCHARGE NOTE ADULT - CARE PLAN
Principal Discharge DX:	Acute congestive heart failure  Goal:	Followup with PMD and take all medications prescribed.  Assessment and plan of treatment:	Followup with PMD and take all medications prescribed. Low salt, low fat, low cholesterol diet  Secondary Diagnosis:	Hypertensive urgency  Goal:	Followup with PMD and take all medications prescribed.  Assessment and plan of treatment:	Followup with PMD and take all medications prescribed. Low salt, low fat, low cholesterol diet  Secondary Diagnosis:	COPD (chronic obstructive pulmonary disease)  Goal:	Followup with PMD and take all medications prescribed.  Assessment and plan of treatment:	Followup with PMD and take all medications prescribed. Low salt, low fat, low cholesterol diet  Secondary Diagnosis:	Pneumonia  Goal:	Followup with PMD and take all medications prescribed.  Assessment and plan of treatment:	Followup with PMD and take all medications prescribed. Low salt, low fat, low cholesterol diet Principal Discharge DX:	Acute congestive heart failure  Goal:	Followup with PMD and take all medications prescribed.  Assessment and plan of treatment:	Followup with PMD and take all medications prescribed. Low salt, low fat, low cholesterol diet  Secondary Diagnosis:	Hypertensive urgency  Goal:	Followup with PMD and take all medications prescribed.  Assessment and plan of treatment:	Followup with PMD and take all medications prescribed. Low salt, low fat, low cholesterol diet  Secondary Diagnosis:	COPD (chronic obstructive pulmonary disease)  Goal:	Followup with PMD and take all medications prescribed.  Assessment and plan of treatment:	Followup with PMD and take all medications prescribed. Low salt, low fat, low cholesterol diet  Secondary Diagnosis:	Pneumonia  Goal:	Followup with PMD and take all medications prescribed.  Assessment and plan of treatment:	Followup with PMD and take all medications prescribed. Low salt, low fat, low cholesterol diet  Repeat CT chest in 6-8 weeks

## 2018-11-02 NOTE — PROGRESS NOTE ADULT - PROBLEM SELECTOR PROBLEM 2
COPD (chronic obstructive pulmonary disease)
Hypertensive urgency
COPD (chronic obstructive pulmonary disease)

## 2018-11-02 NOTE — PROGRESS NOTE ADULT - PROBLEM SELECTOR PROBLEM 4
Need for prophylactic measure
Hypertensive urgency
Need for prophylactic measure
Need for prophylactic measure

## 2018-11-02 NOTE — PROGRESS NOTE ADULT - PROBLEM SELECTOR PLAN 2
As above
As above
Have not been wheezing: Cont Symbicort!!
No wheezing: no need for steroids: Cont BD !!
No wheezing: no need for steroids: Cont BD !!  10/31: cont bd: Not wheezing!!  141/1: No wheezing: Cont Symbicort: oupt PFT ~!
As above
No wheezing: no need for steroids: Cont BD !!  10/31: cont bd: Not wheezing!!

## 2018-11-02 NOTE — PROGRESS NOTE ADULT - REASON FOR ADMISSION
Shortness of breath

## 2018-11-02 NOTE — PROGRESS NOTE ADULT - PROBLEM SELECTOR PLAN 4
Lovenox 40mg SQ daily
BP is reasonable today  10/31: Better controlled!
BP is reasonable today
BP is reasonable today  10/31: Better controlled!  11/1: Better
BP is reasonable today  10/31: Better controlled!  11/1: Better  11/2: Better controlled
Lovenox 40mg SQ daily
Lovenox 40mg SQ daily

## 2018-11-02 NOTE — DISCHARGE NOTE ADULT - PLAN OF CARE
Followup with PMD and take all medications prescribed. Followup with PMD and take all medications prescribed. Low salt, low fat, low cholesterol diet Followup with PMD and take all medications prescribed. Low salt, low fat, low cholesterol diet  Repeat CT chest in 6-8 weeks

## 2018-11-02 NOTE — PROGRESS NOTE ADULT - SUBJECTIVE AND OBJECTIVE BOX
Cardiology Attending Progress Note    Significant unintentional weight loss  Feels better today  CTA chest noted no PE, but possible PNA  On Ceftriaxone/azithromycin  Start Coreg 6.25 BID, continue lisinopril  Change to PO Lasix    Will arrange for CT abdomen/pelvis with PO/IV contrast.    No new complaints  Telemetry: no new events    Vital Signs Last 24 Hrs  T(C): 36.7 (31 Oct 2018 05:15), Max: 37.3 (30 Oct 2018 13:13)  T(F): 98.1 (31 Oct 2018 05:15), Max: 99.1 (30 Oct 2018 13:13)  HR: 98 (31 Oct 2018 05:15) (96 - 108)  BP: 115/60 (31 Oct 2018 05:15) (115/60 - 155/82)  BP(mean): --  RR: 20 (31 Oct 2018 05:15) (20 - 25)  SpO2: 100% (31 Oct 2018 05:15) (95% - 100%)    NAD, thin/cachetic  No JVD  Reg S1S2  Decreased breath sounds bilaterally  Soft thin abdomen  No edema    MEDICATIONS  (STANDING):  aspirin enteric coated 81 milliGRAM(s) Oral daily  azithromycin  IVPB      azithromycin  IVPB 500 milliGRAM(s) IV Intermittent every 24 hours  buDESOnide 160 MICROgram(s)/formoterol 4.5 MICROgram(s) Inhaler 2 Puff(s) Inhalation two times a day  cefTRIAXone   IVPB 1 Gram(s) IV Intermittent every 24 hours  cefTRIAXone   IVPB      enoxaparin Injectable 40 milliGRAM(s) SubCutaneous every 24 hours  furosemide   Injectable 40 milliGRAM(s) IV Push every 12 hours  hydrALAZINE 25 milliGRAM(s) Oral every 8 hours  lisinopril 10 milliGRAM(s) Oral daily  melatonin 3 milliGRAM(s) Oral at bedtime  nicotine -  14 mG/24Hr(s) Patch 1 patch Transdermal daily  tiotropium 18 MICROgram(s) Capsule 1 Capsule(s) Inhalation daily    MEDICATIONS  (PRN):  levalbuterol Inhalation 0.63 milliGRAM(s) Inhalation every 6 hours PRN wheezness/short of breath                          11.7   4.65  )-----------( 211      ( 31 Oct 2018 06:18 )             36.2   10-31    136  |  90<L>  |  8   ----------------------------<  95  3.6   |  38<H>  |  0.54    Ca    8.9      31 Oct 2018 06:18  Phos  3.9     10-30  Mg     1.7     10-30    TPro  6.8  /  Alb  3.2<L>  /  TBili  0.4  /  DBili  x   /  AST  17  /  ALT  9   /  AlkPhos  60  10-30                 m: CT Angio Chest w/ IV Cont (10.29.18 @ 12:26) >    EXAM:  CT ANGIO CHEST (W)AW IC        PROCEDURE DATE:  Oct 29 2018         INTERPRETATION:  Clinical indications: Shortness of breath, tachycardia,   cough.    CT pulmonary angiogram was performed following uncomplicated intravenous   administration of 90 cc of Omnipaque-350. 10 cc of contrast was   discarded. MIP images are submitted.    No prior chest CTs are available for comparison. There are no pulmonary   arterial emboli.    There are no pathologically enlarged bilateral axillary, mediastinal or   hilar lymph nodes. Heart size is enlarged. There is trace pericardial   fluid. Coronary artery calcifications are noted. There is suggestion of   left ventricular hypertrophy. There is a trace right pleural effusion.   There is atherosclerotic disease of the aorta. There is subcutaneous   edema. There is mild dilatation of the esophagus.    Evaluation of the upper abdominal organs demonstrate subcentimeter   hepatic hypodensities which are too small to characterize.    Evaluation of the lung parenchyma demonstrate emphysema. There are left   lower lobe areas of linear or subsegmental atelectasis. There are patchy   and nodular consolidations within the right lower lobe most confluent at   the right lung base. There is a cluster of tree-in-bud opacities or 2 mm   nodules within the central aspect of the right middle lobe. There are   secretions within the trachea. There is complete opacification of the   lower portion of the bronchus intermedius related to secretions and/or   debris extending into the right lower lobe bronchus and some of its   segmental and subsegmental branches. There is contiguous complete   opacification of the proximal segment of the right middle lobe bronchus.   Secretions and or debris are also noted within the left main stem   bronchus.    Evaluation of the bone demonstrate degenerative changes of the spine and   the shoulders.    IMPRESSION: Right lung base nodular and patchy consolidations likely   infectious in etiology. Opacification of the right lower lung central   airways as described above related to secretions and or debris. The   constellation of findings may represent aspiration pneumonia given the   distribution. A 1-3 month follow-up CT is recommended to ensure   resolution.    Emphysema.    Cardiomegaly possibly associated with left ventricular hypertrophy.   Coronary artery atherosclerotic disease.          < from: Xray Chest 2 Views PA/Lat (10.29.18 @ 02:37) >  EXAM:  XR CHEST PA LAT 2V        PROCEDURE DATE:  Oct 29 2018         INTERPRETATION:  EXAMINATION: XR CHEST PA LAT 2V    CLINICAL INDICATION: sob    TECHNIQUE: Frontal and lateral views of the chest were obtained.    COMPARISON: None.    IMPRESSION:  Appearance of nonspecific slightly increased right basilar lung markings   indeterminate for evolving infiltrate/pneumonia in the proper clinical   context versus chronic interstitial change, follow-up suggested as   warranted.    Clear remaining visualized lungs. No pleural effusions or pneumothorax.    Cardiac and mediastinal silhouettes within normal limits.    Trachea midline.    Unremarkable osseous structures.      JAMILA NOEL M.D., RADIOLOGY RESIDENT  This document has beenelectronically signed.  VANITA MCFARLAND M.D., ATTENDING RADIOLOGIST  This document has been electronically signed. Oct 29 2018 11:07AM            < from: Transthoracic Echocardiogram (10.29.18 @ 10:10) >    Patient name: SAURABH EDWARDS  YOB: 1940   Age: 78 (F)   MR#: [id number]  Study Date: 10/29/2018  [facility]  Study quality: Technically good  Referring Physician: Jun Valencia MD  Blood Pressure: 190/88 mmHg  Height: 165 cm  Weight: 48 kg  BSA: 1.5 m2  ------------------------------------------------------------------------  PROCEDURE: Transthoracic echocardiogram with 2-D, M-Mode  and complete spectral and color flow Doppler.  INDICATION: Heart failure, unspecified (I50.9)  ------------------------------------------------------------------------  DIMENSIONS:  Dimensions:     Normal Values:  LA:     3.1 cm    2.0 - 4.0 cm  Ao:     2.8 cm    2.0 - 3.8 cm  SEPTUM: 1.0 cm    0.6 - 1.2 cm  PWT: 1.2 cm    0.6 - 1.1 cm  LVIDd:  4.9 cm    3.0 - 5.6 cm  LVIDs:  3.7 cm    1.8 - 4.0 cm  Derived Variables:  LVMI: 134 g/m2  RWT: 0.48  Fractional short: 24 %  Ejection Fraction (Visual Estimate): 45 %  ------------------------------------------------------------------------  OBSERVATIONS:  Mitral Valve: Mitral annular calcification, thickened  mitral valve leaflets. Mild-moderate mitral regurgitation.  Aortic Root: Normal aortic root.  Aortic Valve: Calcified trileaflet aortic valve with normal  opening.  Left Atrium: Mildly dilated left atrium.  LA volume index =  36 cc/m2.  Left Ventricle: Moderate segmental left ventricular  systolic dysfunction. The inferior and inferolateral walls  are hypokinetic. Severe concentric left ventricular  hypertrophy.  Right Heart: Normal right atrium. Normal right ventricular  size and function. Normal tricuspid valve. Mild tricuspid  regurgitation. Normal pulmonic valve.  Pericardium/PleuraNormal pericardium with no pericardial  effusion.  Hemodynamic:Estimated right ventricular systolic pressure  equals 64 mm Hg, assuming right atrial pressure equals 10  mm Hg, consistent with severe pulmonary hypertension.  ------------------------------------------------------------------------  CONCLUSIONS:  1. Mitral annular calcification, thickened mitral valve  leaflets. Mild-moderate mitral regurgitation.  2. Calcified trileaflet aortic valve with normal opening.  3. Mildly dilated left atrium.  LA volume index = 36 cc/m2.  4. Severe concentric left ventricular hypertrophy.  5. Moderate segmental left ventricular systolic  dysfunction. The inferior and inferolateral walls are  hypokinetic.  6. Normal right ventricular size and function.  7. Normal tricuspid valve. Mild tricuspid regurgitation.  8. Estimated pulmonary artery systolic pressure equals 64  mm Hg, assuming right atrial pressure equals 10  mm Hg,  consistent with severe pulmonary hypertension.  ------------------------------------------------------------------------  Confirmed on  10/29/2018 - 11:09:03 by Debi Teran M.D. RPVI  ------------------------------------------------------------------------    < end of copied text >
Cardiology Attending Progress Note    Significant unintentional weight loss  Feels better today  CTA chest noted no PE, but possible PNA  On Ceftriaxone/azithromycin  Start Coreg 6.25 BID, continue lisinopril  Change to PO Lasix    Will clarify abx regimen for PNA --> transition to PO abx?  Will arrange for CT abdomen/pelvis with PO/IV contrast.    No new complaints  Telemetry: no new events    Vital Signs Last 24 Hrs  T(C): 37.1 (01 Nov 2018 05:46), Max: 37.1 (31 Oct 2018 14:25)  T(F): 98.7 (01 Nov 2018 05:46), Max: 98.8 (31 Oct 2018 14:25)  HR: 90 (01 Nov 2018 05:46) (77 - 103)  BP: 144/67 (01 Nov 2018 05:46) (125/73 - 144/67)  BP(mean): --  RR: 18 (01 Nov 2018 05:46) (17 - 18)  SpO2: 100% (01 Nov 2018 05:46) (100% - 100%)    NAD, thin/cachetic  No JVD  Reg S1S2  Decreased breath sounds bilaterally  Soft thin abdomen  No edema    MEDICATIONS  (STANDING):  aspirin enteric coated 81 milliGRAM(s) Oral daily  buDESOnide 160 MICROgram(s)/formoterol 4.5 MICROgram(s) Inhaler 2 Puff(s) Inhalation two times a day  carvedilol 6.25 milliGRAM(s) Oral every 12 hours  cefTRIAXone   IVPB 1 Gram(s) IV Intermittent every 24 hours  cefTRIAXone   IVPB      enoxaparin Injectable 40 milliGRAM(s) SubCutaneous every 24 hours  furosemide    Tablet 40 milliGRAM(s) Oral two times a day  hydrALAZINE 25 milliGRAM(s) Oral every 8 hours  lisinopril 10 milliGRAM(s) Oral daily  melatonin 3 milliGRAM(s) Oral at bedtime  nicotine -  14 mG/24Hr(s) Patch 1 patch Transdermal daily  tiotropium 18 MICROgram(s) Capsule 1 Capsule(s) Inhalation daily    MEDICATIONS  (PRN):  levalbuterol Inhalation 0.63 milliGRAM(s) Inhalation every 6 hours PRN wheezness/short of breath                                     11.2   4.30  )-----------( 200      ( 01 Nov 2018 05:50 )             35.6   11-01    135  |  87<L>  |  8   ----------------------------<  82  3.8   |  39<H>  |  0.60    Ca    9.0      01 Nov 2018 05:50  Phos  3.9     10-30  Mg     1.7     11-01    TPro  6.8  /  Alb  3.2<L>  /  TBili  0.4  /  DBili  x   /  AST  17  /  ALT  9   /  AlkPhos  60  10-30                CT Angio Chest w/ IV Cont (10.29.18 @ 12:26) >    EXAM:  CT ANGIO CHEST (W)AW IC        PROCEDURE DATE:  Oct 29 2018         INTERPRETATION:  Clinical indications: Shortness of breath, tachycardia,   cough.    CT pulmonary angiogram was performed following uncomplicated intravenous   administration of 90 cc of Omnipaque-350. 10 cc of contrast was   discarded. MIP images are submitted.    No prior chest CTs are available for comparison. There are no pulmonary   arterial emboli.    There are no pathologically enlarged bilateral axillary, mediastinal or   hilar lymph nodes. Heart size is enlarged. There is trace pericardial   fluid. Coronary artery calcifications are noted. There is suggestion of   left ventricular hypertrophy. There is a trace right pleural effusion.   There is atherosclerotic disease of the aorta. There is subcutaneous   edema. There is mild dilatation of the esophagus.    Evaluation of the upper abdominal organs demonstrate subcentimeter   hepatic hypodensities which are too small to characterize.    Evaluation of the lung parenchyma demonstrate emphysema. There are left   lower lobe areas of linear or subsegmental atelectasis. There are patchy   and nodular consolidations within the right lower lobe most confluent at   the right lung base. There is a cluster of tree-in-bud opacities or 2 mm   nodules within the central aspect of the right middle lobe. There are   secretions within the trachea. There is complete opacification of the   lower portion of the bronchus intermedius related to secretions and/or   debris extending into the right lower lobe bronchus and some of its   segmental and subsegmental branches. There is contiguous complete   opacification of the proximal segment of the right middle lobe bronchus.   Secretions and or debris are also noted within the left main stem   bronchus.    Evaluation of the bone demonstrate degenerative changes of the spine and   the shoulders.    IMPRESSION: Right lung base nodular and patchy consolidations likely   infectious in etiology. Opacification of the right lower lung central   airways as described above related to secretions and or debris. The   constellation of findings may represent aspiration pneumonia given the   distribution. A 1-3 month follow-up CT is recommended to ensure   resolution.    Emphysema.    Cardiomegaly possibly associated with left ventricular hypertrophy.   Coronary artery atherosclerotic disease.          < from: Xray Chest 2 Views PA/Lat (10.29.18 @ 02:37) >  EXAM:  XR CHEST PA LAT 2V        PROCEDURE DATE:  Oct 29 2018         INTERPRETATION:  EXAMINATION: XR CHEST PA LAT 2V    CLINICAL INDICATION: sob    TECHNIQUE: Frontal and lateral views of the chest were obtained.    COMPARISON: None.    IMPRESSION:  Appearance of nonspecific slightly increased right basilar lung markings   indeterminate for evolving infiltrate/pneumonia in the proper clinical   context versus chronic interstitial change, follow-up suggested as   warranted.    Clear remaining visualized lungs. No pleural effusions or pneumothorax.    Cardiac and mediastinal silhouettes within normal limits.    Trachea midline.    Unremarkable osseous structures.      JAMILA NOEL M.D., RADIOLOGY RESIDENT  This document has beenelectronically signed.  VANITA MCFARLAND M.D., ATTENDING RADIOLOGIST  This document has been electronically signed. Oct 29 2018 11:07AM            < from: Transthoracic Echocardiogram (10.29.18 @ 10:10) >    Patient name: SAURABH EDWARDS  YOB: 1940   Age: 78 (F)   MR#: [id number]  Study Date: 10/29/2018  [facility]  Study quality: Technically good  Referring Physician: Jun Valencia MD  Blood Pressure: 190/88 mmHg  Height: 165 cm  Weight: 48 kg  BSA: 1.5 m2  ------------------------------------------------------------------------  PROCEDURE: Transthoracic echocardiogram with 2-D, M-Mode  and complete spectral and color flow Doppler.  INDICATION: Heart failure, unspecified (I50.9)  ------------------------------------------------------------------------  DIMENSIONS:  Dimensions:     Normal Values:  LA:     3.1 cm    2.0 - 4.0 cm  Ao:     2.8 cm    2.0 - 3.8 cm  SEPTUM: 1.0 cm    0.6 - 1.2 cm  PWT: 1.2 cm    0.6 - 1.1 cm  LVIDd:  4.9 cm    3.0 - 5.6 cm  LVIDs:  3.7 cm    1.8 - 4.0 cm  Derived Variables:  LVMI: 134 g/m2  RWT: 0.48  Fractional short: 24 %  Ejection Fraction (Visual Estimate): 45 %  ------------------------------------------------------------------------  OBSERVATIONS:  Mitral Valve: Mitral annular calcification, thickened  mitral valve leaflets. Mild-moderate mitral regurgitation.  Aortic Root: Normal aortic root.  Aortic Valve: Calcified trileaflet aortic valve with normal  opening.  Left Atrium: Mildly dilated left atrium.  LA volume index =  36 cc/m2.  Left Ventricle: Moderate segmental left ventricular  systolic dysfunction. The inferior and inferolateral walls  are hypokinetic. Severe concentric left ventricular  hypertrophy.  Right Heart: Normal right atrium. Normal right ventricular  size and function. Normal tricuspid valve. Mild tricuspid  regurgitation. Normal pulmonic valve.  Pericardium/PleuraNormal pericardium with no pericardial  effusion.  Hemodynamic:Estimated right ventricular systolic pressure  equals 64 mm Hg, assuming right atrial pressure equals 10  mm Hg, consistent with severe pulmonary hypertension.  ------------------------------------------------------------------------  CONCLUSIONS:  1. Mitral annular calcification, thickened mitral valve  leaflets. Mild-moderate mitral regurgitation.  2. Calcified trileaflet aortic valve with normal opening.  3. Mildly dilated left atrium.  LA volume index = 36 cc/m2.  4. Severe concentric left ventricular hypertrophy.  5. Moderate segmental left ventricular systolic  dysfunction. The inferior and inferolateral walls are  hypokinetic.  6. Normal right ventricular size and function.  7. Normal tricuspid valve. Mild tricuspid regurgitation.  8. Estimated pulmonary artery systolic pressure equals 64  mm Hg, assuming right atrial pressure equals 10  mm Hg,  consistent with severe pulmonary hypertension.  ------------------------------------------------------------------------  Confirmed on  10/29/2018 - 11:09:03 by Debi Teran M.D. RPVI  ------------------------------------------------------------------------    < end of copied text >
Cardiovascular Disease Progress Note  Covering for Dr. Valencia    Overnight events: No acute events overnight.  Ms. Lee says the SOB and cough are much better. No chest pain.   Otherwise review of systems negative    Objective Findings:  T(C): 36.8 (18 @ 09:50), Max: 37 (18 @ 14:12)  HR: 80 (18 @ 09:50) (80 - 109)  BP: 145/76 (18 @ 09:50) (115/52 - 145/76)  RR: 18 (18 @ 09:50) (17 - 18)  SpO2: 100% (18 @ 09:50) (95% - 100%)  Wt(kg): --  Daily     Daily Weight in k (2018 05:04)      Physical Exam:  Gen: NAD  HEENT: EOMI  CV: RRR, normal S1 + S2, no m/r/g  Lungs: CTAB  Abd: soft, non-tender  Ext: No edema    Telemetry: Sinus; 4 beats WCT overnight.     Laboratory Data:                        12.0   5.43  )-----------( 199      ( 2018 06:15 )             37.3         131<L>  |  83<L>  |  14  ----------------------------<  85  3.9   |  37<H>  |  0.57    Ca    8.8      2018 06:15  Mg     1.7                     Inpatient Medications:  MEDICATIONS  (STANDING):  aspirin enteric coated 81 milliGRAM(s) Oral daily  buDESOnide 160 MICROgram(s)/formoterol 4.5 MICROgram(s) Inhaler 2 Puff(s) Inhalation two times a day  carvedilol 6.25 milliGRAM(s) Oral every 12 hours  cefTRIAXone   IVPB 1 Gram(s) IV Intermittent every 24 hours  cefTRIAXone   IVPB      enoxaparin Injectable 40 milliGRAM(s) SubCutaneous every 24 hours  furosemide    Tablet 40 milliGRAM(s) Oral two times a day  hydrALAZINE 25 milliGRAM(s) Oral every 8 hours  ibuprofen  Tablet. 200 milliGRAM(s) Oral two times a day  isosorbide   mononitrate ER Tablet (IMDUR) 30 milliGRAM(s) Oral daily  lisinopril 10 milliGRAM(s) Oral daily  melatonin 3 milliGRAM(s) Oral at bedtime  nicotine -  14 mG/24Hr(s) Patch 1 patch Transdermal daily  tiotropium 18 MICROgram(s) Capsule 1 Capsule(s) Inhalation daily      Assessment: 79 y/o female with a PMHx of tobacco abuse but otherwise no significant PMHx presents to ED with dyspnea on exertion, orthopnea, and lower extremity edema in the setting of acute systolic heart failure with superimposed CAP.  Also with significant recent unintentional weight loss.    #Cardiomyopathy/acute systolic CHF  Now euvolemic.  Tolerating PO Lasix 40 BID, strict I/Os, daily standing weights  On Coreg 6.25 BID, continue lisinopril  On hydralazine and Imdur 30    #PNA:  Start Azithromycin 250 mg daily to complete a total of 7 days ABx    #Unintentional weight loss  Due to decreased appetite, improved with Megace.  CT scan negative for malignancy.    Discharge planning today and f/u with her PMD, Dr. Graves.  Care discussed with patient.     Over 25 minutes spent on total encounter; more than 50% of the visit was spent counseling and/or coordinating care by the attending physician.      Brady Alvares MD [facility]  Cardiovascular Disease  [phone]
Patient is a 78y old  Female who presents with a chief complaint of Shortness of breath (01 Nov 2018 08:47)      Any change in ROS: Doing pretty well walking around on floor    MEDICATIONS  (STANDING):  aspirin enteric coated 81 milliGRAM(s) Oral daily  buDESOnide 160 MICROgram(s)/formoterol 4.5 MICROgram(s) Inhaler 2 Puff(s) Inhalation two times a day  carvedilol 6.25 milliGRAM(s) Oral every 12 hours  cefTRIAXone   IVPB 1 Gram(s) IV Intermittent every 24 hours  cefTRIAXone   IVPB      enoxaparin Injectable 40 milliGRAM(s) SubCutaneous every 24 hours  furosemide    Tablet 40 milliGRAM(s) Oral two times a day  hydrALAZINE 25 milliGRAM(s) Oral every 8 hours  ibuprofen  Tablet. 200 milliGRAM(s) Oral two times a day  lisinopril 10 milliGRAM(s) Oral daily  melatonin 3 milliGRAM(s) Oral at bedtime  nicotine -  14 mG/24Hr(s) Patch 1 patch Transdermal daily  tiotropium 18 MICROgram(s) Capsule 1 Capsule(s) Inhalation daily    MEDICATIONS  (PRN):  levalbuterol Inhalation 0.63 milliGRAM(s) Inhalation every 6 hours PRN wheezness/short of breath    Vital Signs Last 24 Hrs  T(C): 37.1 (01 Nov 2018 05:46), Max: 37.1 (31 Oct 2018 14:25)  T(F): 98.7 (01 Nov 2018 05:46), Max: 98.8 (31 Oct 2018 14:25)  HR: 90 (01 Nov 2018 05:46) (77 - 103)  BP: 144/67 (01 Nov 2018 05:46) (125/73 - 144/67)  BP(mean): --  RR: 18 (01 Nov 2018 05:46) (17 - 18)  SpO2: 100% (01 Nov 2018 05:46) (100% - 100%)    I&O's Summary    31 Oct 2018 07:01  -  01 Nov 2018 07:00  --------------------------------------------------------  IN: 480 mL / OUT: 250 mL / NET: 230 mL    01 Nov 2018 07:01  -  01 Nov 2018 13:09  --------------------------------------------------------  IN: 0 mL / OUT: 300 mL / NET: -300 mL          Physical Exam:   GENERAL: NAD, well-groomed, well-developed  HEENT: BALTA/   Atraumatic, Normocephalic  ENMT: No tonsillar erythema, exudates, or enlargement; Moist mucous membranes, Good dentition, No lesions  NECK: Supple, No JVD, Normal thyroid  CHEST/LUNG: Crackles right base  CVS: Regular rate and rhythm; No murmurs, rubs, or gallops  GI: : Soft, Nontender, Nondistended; Bowel sounds present  NERVOUS SYSTEM:  Alert & Oriented X3  EXTREMITIES:  2+ Peripheral Pulses, No clubbing, cyanosis, or edema  LYMPH: No lymphadenopathy noted  SKIN: No rashes or lesions  ENDOCRINOLOGY: No Thyromegaly  PSYCH: Appropriate    Labs:                              11.2   4.30  )-----------( 200      ( 01 Nov 2018 05:50 )             35.6                         11.7   4.65  )-----------( 211      ( 31 Oct 2018 06:18 )             36.2                         12.5   7.53  )-----------( 207      ( 30 Oct 2018 09:00 )             38.7                         12.5   6.12  )-----------( 222      ( 29 Oct 2018 02:22 )             39.1     11-01    135  |  87<L>  |  8   ----------------------------<  82  3.8   |  39<H>  |  0.60  10-31    136  |  90<L>  |  8   ----------------------------<  95  3.6   |  38<H>  |  0.54  10-30    136  |  89<L>  |  8   ----------------------------<  120<H>  3.5   |  40<H>  |  0.60  10-29    133<L>  |  92<L>  |  14  ----------------------------<  86  5.3   |  32<H>  |  0.54    Ca    9.0      01 Nov 2018 05:50  Ca    8.9      31 Oct 2018 06:18  Mg     1.7     11-01    TPro  6.8  /  Alb  3.2<L>  /  TBili  0.4  /  DBili  x   /  AST  17  /  ALT  9   /  AlkPhos  60  10-30  TPro  6.9  /  Alb  3.2<L>  /  TBili  0.3  /  DBili  x   /  AST  31  /  ALT  13  /  AlkPhos  54  10-29    CAPILLARY BLOOD GLUCOSE          < from: CT Angio Chest w/ IV Cont (10.29.18 @ 12:26) >  opacification of the proximal segment of the right middle lobe bronchus.   Secretions and or debris are also noted within the left main stem   bronchus.    Evaluation of the bone demonstrate degenerative changes of the spine and   the shoulders.    IMPRESSION: Right lung base nodular and patchy consolidations likely   infectious in etiology. Opacification of the right lower lung central   airways as described above related to secretions and or debris. The   constellation of findings may represent aspiration pneumonia given the   distribution. A 1-3 month follow-up CT is recommended to ensure   resolution.    Emphysema.    Cardiomegaly possibly associated with left ventricular hypertrophy.   Coronary artery atherosclerotic disease.      < end of copied text >              RECENT CULTURES:  10-29 @ 19:25 BLOOD VENOUS                  NO ORGANISMS ISOLATED  NO ORGANISMS ISOLATED AT 48 HRS.  10-29 @ 15:26 BLOOD PERIPHERAL                  NO ORGANISMS ISOLATED  NO ORGANISMS ISOLATED AT 48 HRS.        RESPIRATORY CULTURES:          Studies  Chest X-RAY  CT SCAN Chest   Venous Dopplers: LE:   CT Abdomen  Others
Patient is a 78y old  Female who presents with a chief complaint of Shortness of breath (02 Nov 2018 13:11)      Any change in ROS: I feel better     MEDICATIONS  (STANDING):  aspirin enteric coated 81 milliGRAM(s) Oral daily  buDESOnide 160 MICROgram(s)/formoterol 4.5 MICROgram(s) Inhaler 2 Puff(s) Inhalation two times a day  carvedilol 6.25 milliGRAM(s) Oral every 12 hours  cefTRIAXone   IVPB 1 Gram(s) IV Intermittent every 24 hours  cefTRIAXone   IVPB      enoxaparin Injectable 40 milliGRAM(s) SubCutaneous every 24 hours  furosemide    Tablet 40 milliGRAM(s) Oral two times a day  hydrALAZINE 25 milliGRAM(s) Oral every 8 hours  ibuprofen  Tablet. 200 milliGRAM(s) Oral two times a day  isosorbide   mononitrate ER Tablet (IMDUR) 30 milliGRAM(s) Oral daily  lisinopril 10 milliGRAM(s) Oral daily  melatonin 3 milliGRAM(s) Oral at bedtime  nicotine -  14 mG/24Hr(s) Patch 1 patch Transdermal daily  tiotropium 18 MICROgram(s) Capsule 1 Capsule(s) Inhalation daily    MEDICATIONS  (PRN):  levalbuterol Inhalation 0.63 milliGRAM(s) Inhalation every 6 hours PRN wheezness/short of breath    Vital Signs Last 24 Hrs  T(C): 36.8 (02 Nov 2018 09:50), Max: 37 (01 Nov 2018 14:12)  T(F): 98.2 (02 Nov 2018 09:50), Max: 98.6 (01 Nov 2018 14:12)  HR: 80 (02 Nov 2018 09:50) (80 - 109)  BP: 145/76 (02 Nov 2018 09:50) (115/52 - 145/76)  BP(mean): --  RR: 18 (02 Nov 2018 09:50) (17 - 18)  SpO2: 100% (02 Nov 2018 09:50) (95% - 100%)    I&O's Summary    01 Nov 2018 07:01  -  02 Nov 2018 07:00  --------------------------------------------------------  IN: 0 mL / OUT: 600 mL / NET: -600 mL          Physical Exam:   GENERAL: Thin and cahectic+  HEENT: BALTA/   Atraumatic, Normocephalic  ENMT: No tonsillar erythema, exudates, or enlargement; Moist mucous membranes, Good dentition, No lesions  NECK: Supple, No JVD, Normal thyroid  CHEST/LUNG: Clear to auscultaion, ; No rales, rhonchi, wheezing, or rubs  CVS: Regular rate and rhythm; No murmurs, rubs, or gallops  GI: : Soft, Nontender, Nondistended; Bowel sounds present  NERVOUS SYSTEM:  Alert & Oriented X3  EXTREMITIES:  2+ Peripheral Pulses, No clubbing, cyanosis, or edema  LYMPH: No lymphadenopathy noted  SKIN: No rashes or lesions  ENDOCRINOLOGY: No Thyromegaly  PSYCH: Appropriate    Labs:                              12.0   5.43  )-----------( 199      ( 02 Nov 2018 06:15 )             37.3                         11.2   4.30  )-----------( 200      ( 01 Nov 2018 05:50 )             35.6                         11.7   4.65  )-----------( 211      ( 31 Oct 2018 06:18 )             36.2                         12.5   7.53  )-----------( 207      ( 30 Oct 2018 09:00 )             38.7     11-02    131<L>  |  83<L>  |  14  ----------------------------<  85  3.9   |  37<H>  |  0.57  11-01    135  |  87<L>  |  8   ----------------------------<  82  3.8   |  39<H>  |  0.60  10-31    136  |  90<L>  |  8   ----------------------------<  95  3.6   |  38<H>  |  0.54  10-30    136  |  89<L>  |  8   ----------------------------<  120<H>  3.5   |  40<H>  |  0.60    Ca    8.8      02 Nov 2018 06:15  Ca    9.0      01 Nov 2018 05:50  Mg     1.7     11-02  Mg     1.7     11-01    TPro  6.8  /  Alb  3.2<L>  /  TBili  0.4  /  DBili  x   /  AST  17  /  ALT  9   /  AlkPhos  60  10-30    CAPILLARY BLOOD GLUCOSE                      RECENT CULTURES:  10-29 @ 19:25 BLOOD VENOUS         < from: CT Abdomen and Pelvis w/ Oral Cont and w/ IV Cont (11.01.18 @ 21:36) >  KIDNEYS/URETERS: Within normal limits.    BLADDER: Within normal limits.  REPRODUCTIVE ORGANS: Retroverted uterus demonstrate a calcified myoma.   The bilateral adnexa are unremarkable.    BOWEL: No bowel obstruction. Appendix is not visualized.  PERITONEUM: No ascites.  VESSELS:  Atherosclerotic calcification. No abdominal aortic aneurysm.  RETROPERITONEUM: No lymphadenopathy.    ABDOMINAL WALL: Mild diffuse subcutaneous edema. Bilateral injection   granuloma in the superficial gluteal regions. Subcutaneous gas in the   right anterior abdominal walllikely relates to recent injection.  BONES: Degenerative changes are present in the spine. No suspicious bony   lesions.    IMPRESSION:   *  No evidence of suspicious mass or lymphadenopathy in the abdomen and   pelvis.  *  Tiny hypodensities in the liver are formally too small to   characterize, but favored to be cysts.  *  Small calcified uterine myoma.  *  Borderline cardiomegaly.      < end of copied text >           NO ORGANISMS ISOLATED  NO ORGANISMS ISOLATED AT 72 HRS.  10-29 @ 15:26 BLOOD PERIPHERAL            < from: CT Angio Chest w/ IV Cont (10.29.18 @ 12:26) >    Evaluation of the lung parenchyma demonstrate emphysema. There are left   lower lobe areas of linear or subsegmental atelectasis. There are patchy   and nodular consolidations within the right lower lobe most confluent at   the right lung base. There is a cluster of tree-in-bud opacities or 2 mm   nodules within the central aspect of the right middle lobe. There are   secretions within the trachea. There is complete opacification of the   lower portion of the bronchus intermedius related to secretions and/or   debris extending into the right lower lobe bronchus and some of its   segmental and subsegmental branches. There is contiguous complete   opacification of the proximal segment of the right middle lobe bronchus.   Secretions and or debris are also noted within the left main stem   bronchus.    Evaluation of the bone demonstrate degenerative changes of the spine and   the shoulders.    IMPRESSION: Right lung base nodular and patchy consolidations likely   infectious in etiology. Opacification of the right lower lung central   airways as described above related to secretions and or debris. The   constellation of findings may represent aspiration pneumonia given the   distribution. A 1-3 month follow-up CT is recommended to ensure   resolution.    Emphysema.    Cardiomegaly possibly associated with left ventricular hypertrophy.   Coronary artery atherosclerotic disease.                  PERLA CHAND M.D. ATTENDING RADIOLOGIST  This document has been electronically signed. Oct 29 2018 12:44PM        < end of copied text >        NO ORGANISMS ISOLATED  NO ORGANISMS ISOLATED AT 72 HRS.        RESPIRATORY CULTURES:          Studies  Chest X-RAY  CT SCAN Chest   Venous Dopplers: LE:   CT Abdomen  Others
Patient is a 78y old  Female who presents with a chief complaint of Shortness of breath (30 Oct 2018 11:55)      Any change in ROS: I am feeling better    MEDICATIONS  (STANDING):  aspirin enteric coated 81 milliGRAM(s) Oral daily  azithromycin  IVPB      azithromycin  IVPB 500 milliGRAM(s) IV Intermittent every 24 hours  buDESOnide 160 MICROgram(s)/formoterol 4.5 MICROgram(s) Inhaler 2 Puff(s) Inhalation two times a day  cefTRIAXone   IVPB 1 Gram(s) IV Intermittent every 24 hours  cefTRIAXone   IVPB      enoxaparin Injectable 40 milliGRAM(s) SubCutaneous every 24 hours  furosemide   Injectable 40 milliGRAM(s) IV Push every 12 hours  hydrALAZINE 25 milliGRAM(s) Oral every 8 hours  lisinopril 10 milliGRAM(s) Oral daily  nicotine -  14 mG/24Hr(s) Patch 1 patch Transdermal daily  tiotropium 18 MICROgram(s) Capsule 1 Capsule(s) Inhalation daily    MEDICATIONS  (PRN):  levalbuterol Inhalation 0.63 milliGRAM(s) Inhalation every 6 hours PRN wheezness/short of breath    Vital Signs Last 24 Hrs  T(C): 36.8 (30 Oct 2018 17:12), Max: 37.3 (30 Oct 2018 13:13)  T(F): 98.2 (30 Oct 2018 17:12), Max: 99.1 (30 Oct 2018 13:13)  HR: 98 (30 Oct 2018 17:12) (97 - 108)  BP: 152/82 (30 Oct 2018 17:12) (152/82 - 184/84)  BP(mean): --  RR: 20 (30 Oct 2018 17:12) (18 - 25)  SpO2: 100% (30 Oct 2018 17:12) (95% - 100%)    I&O's Summary    30 Oct 2018 07:01  -  30 Oct 2018 17:18  --------------------------------------------------------  IN: 0 mL / OUT: 250 mL / NET: -250 mL          Physical Exam:   GENERAL: NAD, well-groomed, well-developed  HEENT: BALTA/   Atraumatic, Normocephalic  ENMT: No tonsillar erythema, exudates, or enlargement; Moist mucous membranes, Good dentition, No lesions  NECK: Supple, No JVD, Normal thyroid  CHEST/LUNG: Clear to auscultaion  CVS: Regular rate and rhythm; No murmurs, rubs, or gallops  GI: : Soft, Nontender, Nondistended; Bowel sounds present  NERVOUS SYSTEM:  Alert & Oriented X3  EXTREMITIES: -edema  LYMPH: No lymphadenopathy noted  SKIN: No rashes or lesions  ENDOCRINOLOGY: No Thyromegaly  PSYCH: Appropriate    Labs:                              12.5   7.53  )-----------( 207      ( 30 Oct 2018 09:00 )             38.7                         12.5   6.12  )-----------( 222      ( 29 Oct 2018 02:22 )             39.1     10-30    136  |  89<L>  |  8   ----------------------------<  120<H>  3.5   |  40<H>  |  0.60  10-29    133<L>  |  92<L>  |  14  ----------------------------<  86  5.3   |  32<H>  |  0.54    Ca    9.4      30 Oct 2018 09:00  Ca    9.2      29 Oct 2018 02:22  Phos  3.9     10-30  Mg     1.7     10-30    TPro  6.8  /  Alb  3.2<L>  /  TBili  0.4  /  DBili  x   /  AST  17  /  ALT  9   /  AlkPhos  60  10-30  TPro  6.9  /  Alb  3.2<L>  /  TBili  0.3  /  DBili  x   /  AST  31  /  ALT  13  /  AlkPhos  54  10-29    CAPILLARY BLOOD GLUCOSE          LIVER FUNCTIONS - ( 30 Oct 2018 09:00 )  Alb: 3.2 g/dL / Pro: 6.8 g/dL / ALK PHOS: 60 u/L / ALT: 9 u/L / AST: 17 u/L / GGT: x               Serum Pro-Brain Natriuretic Peptide: 2293 pg/mL (10-29 @ 02:22)        RECENT CULTURES:  10-29 @ 15:26 BLOOD PERIPHERAL         < from: CT Angio Chest w/ IV Cont (10.29.18 @ 12:26) >  lower lobe areas of linear or subsegmental atelectasis. There are patchy   and nodular consolidations within the right lower lobe most confluent at   the right lung base. There is a cluster of tree-in-bud opacities or 2 mm   nodules within the central aspect of the right middle lobe. There are   secretions within the trachea. There is complete opacification of the   lower portion of the bronchus intermedius related to secretions and/or   debris extending into the right lower lobe bronchus and some of its   segmental and subsegmental branches. There is contiguous complete   opacification of the proximal segment of the right middle lobe bronchus.   Secretions and or debris are also noted within the left main stem   bronchus.    Evaluation of the bone demonstrate degenerative changes of the spine and   the shoulders.    IMPRESSION: Right lung base nodular and patchy consolidations likely   infectious in etiology. Opacification of the right lower lung central   airways as described above related to secretions and or debris. The   constellation of findings may represent aspiration pneumonia given the   distribution. A 1-3 month follow-up CT is recommended to ensure   resolution.    Emphysema.    Cardiomegaly possibly associated with left ventricular hypertrophy.   Coronary artery atherosclerotic disease.    < end of copied text >           NO ORGANISMS ISOLATED  NO ORGANISMS ISOLATED AT 24 HOURS        RESPIRATORY CULTURES:          Studies  Chest X-RAY  CT SCAN Chest   Venous Dopplers: LE:   CT Abdomen  Others
Patient is a 78y old  Female who presents with a chief complaint of Shortness of breath (31 Oct 2018 07:47)      Any change in ROS: I feel better     MEDICATIONS  (STANDING):  aspirin enteric coated 81 milliGRAM(s) Oral daily  azithromycin  IVPB      azithromycin  IVPB 500 milliGRAM(s) IV Intermittent every 24 hours  buDESOnide 160 MICROgram(s)/formoterol 4.5 MICROgram(s) Inhaler 2 Puff(s) Inhalation two times a day  carvedilol 6.25 milliGRAM(s) Oral every 12 hours  cefTRIAXone   IVPB 1 Gram(s) IV Intermittent every 24 hours  cefTRIAXone   IVPB      enoxaparin Injectable 40 milliGRAM(s) SubCutaneous every 24 hours  furosemide    Tablet 40 milliGRAM(s) Oral two times a day  hydrALAZINE 25 milliGRAM(s) Oral every 8 hours  lisinopril 10 milliGRAM(s) Oral daily  melatonin 3 milliGRAM(s) Oral at bedtime  nicotine -  14 mG/24Hr(s) Patch 1 patch Transdermal daily  tiotropium 18 MICROgram(s) Capsule 1 Capsule(s) Inhalation daily    MEDICATIONS  (PRN):  levalbuterol Inhalation 0.63 milliGRAM(s) Inhalation every 6 hours PRN wheezness/short of breath    Vital Signs Last 24 Hrs  T(C): 37.1 (31 Oct 2018 14:25), Max: 37.2 (30 Oct 2018 20:38)  T(F): 98.8 (31 Oct 2018 14:25), Max: 98.9 (30 Oct 2018 20:38)  HR: 103 (31 Oct 2018 14:25) (96 - 103)  BP: 126/58 (31 Oct 2018 14:25) (115/60 - 152/82)  BP(mean): --  RR: 18 (31 Oct 2018 14:25) (18 - 20)  SpO2: 100% (31 Oct 2018 14:25) (97% - 100%)    I&O's Summary    30 Oct 2018 07:01  -  31 Oct 2018 07:00  --------------------------------------------------------  IN: 0 mL / OUT: 250 mL / NET: -250 mL          Physical Exam:   GENERAL: NAD, well-groomed, well-developed  HEENT: BALTA/   Atraumatic, Normocephalic  ENMT: No tonsillar erythema, exudates, or enlargement; Moist mucous membranes, Good dentition, No lesions  NECK: Supple, No JVD, Normal thyroid  CHEST/LUNG: Clear to auscultaion  CVS: Regular rate and rhythm; No murmurs, rubs, or gallops  GI: : Soft, Nontender, Nondistended; Bowel sounds present  NERVOUS SYSTEM:  Alert & Oriented X3  EXTREMITIES:  - edema  LYMPH: No lymphadenopathy noted  SKIN: No rashes or lesions  ENDOCRINOLOGY: No Thyromegaly  PSYCH: Appropriate    Labs:                              11.7   4.65  )-----------( 211      ( 31 Oct 2018 06:18 )             36.2                         12.5   7.53  )-----------( 207      ( 30 Oct 2018 09:00 )             38.7                         12.5   6.12  )-----------( 222      ( 29 Oct 2018 02:22 )             39.1     10-31    136  |  90<L>  |  8   ----------------------------<  95  3.6   |  38<H>  |  0.54  10-30    136  |  89<L>  |  8   ----------------------------<  120<H>  3.5   |  40<H>  |  0.60  10-29    133<L>  |  92<L>  |  14  ----------------------------<  86  5.3   |  32<H>  |  0.54    Ca    8.9      31 Oct 2018 06:18  Ca    9.4      30 Oct 2018 09:00  Phos  3.9     10-30  Mg     1.7     10-30    TPro  6.8  /  Alb  3.2<L>  /  TBili  0.4  /  DBili  x   /  AST  17  /  ALT  9   /  AlkPhos  60  10-30  TPro  6.9  /  Alb  3.2<L>  /  TBili  0.3  /  DBili  x   /  AST  31  /  ALT  13  /  AlkPhos  54  10-29    CAPILLARY BLOOD GLUCOSE      POCT Blood Glucose.: 157 mg/dL (30 Oct 2018 22:25)  POCT Blood Glucose.: 149 mg/dL (30 Oct 2018 18:07)      LIVER FUNCTIONS - ( 30 Oct 2018 09:00 )  Alb: 3.2 g/dL / Pro: 6.8 g/dL / ALK PHOS: 60 u/L / ALT: 9 u/L / AST: 17 u/L / GGT: x               Serum Pro-Brain Natriuretic Peptide: 2293 pg/mL (10-29 @ 02:22)        RECENT CULTURES:  10-29 @ 19:25 BLOOD VENOUS                  NO ORGANISMS ISOLATED  NO ORGANISMS ISOLATED AT 24 HOURS  10-29 @ 15:26 BLOOD PERIPHERAL       < from: CT Angio Chest w/ IV Cont (10.29.18 @ 12:26) >  Evaluation of the lung parenchyma demonstrate emphysema. There are left   lower lobe areas of linear or subsegmental atelectasis. There are patchy   and nodular consolidations within the right lower lobe most confluent at   the right lung base. There is a cluster of tree-in-bud opacities or 2 mm   nodules within the central aspect of the right middle lobe. There are   secretions within the trachea. There is complete opacification of the   lower portion of the bronchus intermedius related to secretions and/or   debris extending into the right lower lobe bronchus and some of its   segmental and subsegmental branches. There is contiguous complete   opacification of the proximal segment of the right middle lobe bronchus.   Secretions and or debris are also noted within the left main stem   bronchus.    Evaluation of the bone demonstrate degenerative changes of the spine and   the shoulders.    IMPRESSION: Right lung base nodular and patchy consolidations likely   infectious in etiology. Opacification of the right lower lung central   airways as described above related to secretions and or debris. The   constellation of findings may represent aspiration pneumonia given the   distribution. A 1-3 month follow-up CT is recommended to ensure   resolution.    Emphysema.    Cardiomegaly possibly associated with left ventricular hypertrophy.   Coronary artery atherosclerotic disease.      < end of copied text >             NO ORGANISMS ISOLATED  NO ORGANISMS ISOLATED AT 24 HOURS        RESPIRATORY CULTURES:          Studies  Chest X-RAY  CT SCAN Chest   Venous Dopplers: LE:   CT Abdomen  Others
Cardiology Attending Progress Note    Significant unintentional weight loss  Acute systolic CHF  Feels better today  Continue Lasix IV, strict I/Os, daily standing weights  CTA chest noted no PE, but possible PNA  On Ceftriaxone/azithromycin  Start Coreg 6.25 BID, continue lisinopril    Will arrange for CT abdomen/pelvis with PO/IV contrast.    No new complaints  Telemetry: no new events    Vital Signs Last 24 Hrs  T(C): 36.9 (30 Oct 2018 05:05), Max: 37.2 (29 Oct 2018 21:25)  T(F): 98.4 (30 Oct 2018 05:05), Max: 98.9 (29 Oct 2018 21:25)  HR: 108 (30 Oct 2018 09:39) (97 - 108)  BP: 155/53 (30 Oct 2018 09:39) (152/71 - 225/98)  BP(mean): --  RR: 25 (30 Oct 2018 09:39) (18 - 25)  SpO2: 95% (30 Oct 2018 09:39) (95% - 100%)    NAD, thin/cachetic  No JVD  Reg S1S2  Decreased breath sounds bilaterally  Soft thin abdomen  No edema    MEDICATIONS  (STANDING):  aspirin enteric coated 81 milliGRAM(s) Oral daily  azithromycin  IVPB      azithromycin  IVPB 500 milliGRAM(s) IV Intermittent every 24 hours  buDESOnide 160 MICROgram(s)/formoterol 4.5 MICROgram(s) Inhaler 2 Puff(s) Inhalation two times a day  cefTRIAXone   IVPB 1 Gram(s) IV Intermittent every 24 hours  cefTRIAXone   IVPB      enoxaparin Injectable 40 milliGRAM(s) SubCutaneous every 24 hours  furosemide   Injectable 40 milliGRAM(s) IV Push every 12 hours  hydrALAZINE 25 milliGRAM(s) Oral every 8 hours  lisinopril 10 milliGRAM(s) Oral daily  nicotine -  14 mG/24Hr(s) Patch 1 patch Transdermal daily  tiotropium 18 MICROgram(s) Capsule 1 Capsule(s) Inhalation daily    MEDICATIONS  (PRN):  levalbuterol Inhalation 0.63 milliGRAM(s) Inhalation every 6 hours PRN wheezness/short of breath                          12.5   7.53  )-----------( 207      ( 30 Oct 2018 09:00 )             38.7     10-30    136  |  89<L>  |  8   ----------------------------<  120<H>  3.5   |  40<H>  |  0.60    Ca    9.4      30 Oct 2018 09:00  Phos  3.9     10-30  Mg     1.7     10-30    TPro  6.8  /  Alb  3.2<L>  /  TBili  0.4  /  DBili  x   /  AST  17  /  ALT  9   /  AlkPhos  60  10-30    < from: CT Angio Chest w/ IV Cont (10.29.18 @ 12:26) >    EXAM:  CT ANGIO CHEST (W)AW IC        PROCEDURE DATE:  Oct 29 2018         INTERPRETATION:  Clinical indications: Shortness of breath, tachycardia,   cough.    CT pulmonary angiogram was performed following uncomplicated intravenous   administration of 90 cc of Omnipaque-350. 10 cc of contrast was   discarded. MIP images are submitted.    No prior chest CTs are available for comparison. There are no pulmonary   arterial emboli.    There are no pathologically enlarged bilateral axillary, mediastinal or   hilar lymph nodes. Heart size is enlarged. There is trace pericardial   fluid. Coronary artery calcifications are noted. There is suggestion of   left ventricular hypertrophy. There is a trace right pleural effusion.   There is atherosclerotic disease of the aorta. There is subcutaneous   edema. There is mild dilatation of the esophagus.    Evaluation of the upper abdominal organs demonstrate subcentimeter   hepatic hypodensities which are too small to characterize.    Evaluation of the lung parenchyma demonstrate emphysema. There are left   lower lobe areas of linear or subsegmental atelectasis. There are patchy   and nodular consolidations within the right lower lobe most confluent at   the right lung base. There is a cluster of tree-in-bud opacities or 2 mm   nodules within the central aspect of the right middle lobe. There are   secretions within the trachea. There is complete opacification of the   lower portion of the bronchus intermedius related to secretions and/or   debris extending into the right lower lobe bronchus and some of its   segmental and subsegmental branches. There is contiguous complete   opacification of the proximal segment of the right middle lobe bronchus.   Secretions and or debris are also noted within the left main stem   bronchus.    Evaluation of the bone demonstrate degenerative changes of the spine and   the shoulders.    IMPRESSION: Right lung base nodular and patchy consolidations likely   infectious in etiology. Opacification of the right lower lung central   airways as described above related to secretions and or debris. The   constellation of findings may represent aspiration pneumonia given the   distribution. A 1-3 month follow-up CT is recommended to ensure   resolution.    Emphysema.    Cardiomegaly possibly associated with left ventricular hypertrophy.   Coronary artery atherosclerotic disease.          < from: Xray Chest 2 Views PA/Lat (10.29.18 @ 02:37) >  EXAM:  XR CHEST PA LAT 2V        PROCEDURE DATE:  Oct 29 2018         INTERPRETATION:  EXAMINATION: XR CHEST PA LAT 2V    CLINICAL INDICATION: sob    TECHNIQUE: Frontal and lateral views of the chest were obtained.    COMPARISON: None.    IMPRESSION:  Appearance of nonspecific slightly increased right basilar lung markings   indeterminate for evolving infiltrate/pneumonia in the proper clinical   context versus chronic interstitial change, follow-up suggested as   warranted.    Clear remaining visualized lungs. No pleural effusions or pneumothorax.    Cardiac and mediastinal silhouettes within normal limits.    Trachea midline.    Unremarkable osseous structures.      JAMILA NOEL M.D., RADIOLOGY RESIDENT  This document has beenelectronically signed.  VANITA MCFARLAND M.D., ATTENDING RADIOLOGIST  This document has been electronically signed. Oct 29 2018 11:07AM            < from: Transthoracic Echocardiogram (10.29.18 @ 10:10) >    Patient name: SAURABH EDWARDS  YOB: 1940   Age: 78 (F)   MR#: [id number]  Study Date: 10/29/2018  [facility]  Study quality: Technically good  Referring Physician: Jun Valencia MD  Blood Pressure: 190/88 mmHg  Height: 165 cm  Weight: 48 kg  BSA: 1.5 m2  ------------------------------------------------------------------------  PROCEDURE: Transthoracic echocardiogram with 2-D, M-Mode  and complete spectral and color flow Doppler.  INDICATION: Heart failure, unspecified (I50.9)  ------------------------------------------------------------------------  DIMENSIONS:  Dimensions:     Normal Values:  LA:     3.1 cm    2.0 - 4.0 cm  Ao:     2.8 cm    2.0 - 3.8 cm  SEPTUM: 1.0 cm    0.6 - 1.2 cm  PWT: 1.2 cm    0.6 - 1.1 cm  LVIDd:  4.9 cm    3.0 - 5.6 cm  LVIDs:  3.7 cm    1.8 - 4.0 cm  Derived Variables:  LVMI: 134 g/m2  RWT: 0.48  Fractional short: 24 %  Ejection Fraction (Visual Estimate): 45 %  ------------------------------------------------------------------------  OBSERVATIONS:  Mitral Valve: Mitral annular calcification, thickened  mitral valve leaflets. Mild-moderate mitral regurgitation.  Aortic Root: Normal aortic root.  Aortic Valve: Calcified trileaflet aortic valve with normal  opening.  Left Atrium: Mildly dilated left atrium.  LA volume index =  36 cc/m2.  Left Ventricle: Moderate segmental left ventricular  systolic dysfunction. The inferior and inferolateral walls  are hypokinetic. Severe concentric left ventricular  hypertrophy.  Right Heart: Normal right atrium. Normal right ventricular  size and function. Normal tricuspid valve. Mild tricuspid  regurgitation. Normal pulmonic valve.  Pericardium/PleuraNormal pericardium with no pericardial  effusion.  Hemodynamic:Estimated right ventricular systolic pressure  equals 64 mm Hg, assuming right atrial pressure equals 10  mm Hg, consistent with severe pulmonary hypertension.  ------------------------------------------------------------------------  CONCLUSIONS:  1. Mitral annular calcification, thickened mitral valve  leaflets. Mild-moderate mitral regurgitation.  2. Calcified trileaflet aortic valve with normal opening.  3. Mildly dilated left atrium.  LA volume index = 36 cc/m2.  4. Severe concentric left ventricular hypertrophy.  5. Moderate segmental left ventricular systolic  dysfunction. The inferior and inferolateral walls are  hypokinetic.  6. Normal right ventricular size and function.  7. Normal tricuspid valve. Mild tricuspid regurgitation.  8. Estimated pulmonary artery systolic pressure equals 64  mm Hg, assuming right atrial pressure equals 10  mm Hg,  consistent with severe pulmonary hypertension.  ------------------------------------------------------------------------  Confirmed on  10/29/2018 - 11:09:03 by Debi Teran M.D. RPVI  ------------------------------------------------------------------------    < end of copied text >

## 2018-11-02 NOTE — DISCHARGE NOTE ADULT - HOSPITAL COURSE
79 y/o female with a PMHx of tobacco abuse but otherwise no significant PMHx presents to ED with dyspnea on exertion, orthopnea, and lower extremity edema in the setting of acute systolic heart failure with superimposed CAP.  Also with significant recent unintentional weight loss.    #Cardiomyopathy/acute systolic CHF  Now euvolemic.  Tolerating PO Lasix 40 BID, strict I/Os, daily standing weights  On Coreg 6.25 BID, continue lisinopril  On hydralazine and Imdur 30    #PNA:  Start Azithromycin 250 mg daily to complete a total of 7 days ABx    #Unintentional weight loss  Due to decreased appetite, improved with Megace.  CT scan negative for malignancy.    Discharge planning today and f/u with her PMD, Dr. Graves.  Care discussed with patient.   As per attending, patient stable for discharge.

## 2018-11-02 NOTE — DISCHARGE NOTE ADULT - ADDITIONAL INSTRUCTIONS
Follow up with Cardiologist and PMD within one week of discharge. Call for appointment. Return to ED for any concerning symptoms. Continue medications as prescribed. Low salt, low fat, low cholesterol diet.   f/u with her PMD, Dr. Graves. Follow up with Cardiologist and PMD within one week of discharge. Call for appointment. Return to ED for any concerning symptoms. Continue medications as prescribed. Low salt, low fat, low cholesterol diet.   f/u with her PMD, Dr. Graves. Repeat CT chest in 6-8 weeks

## 2018-11-02 NOTE — PROGRESS NOTE ADULT - PROBLEM SELECTOR PROBLEM 5
Need for prophylactic measure
Tobacco abuse

## 2018-11-03 LAB
BACTERIA BLD CULT: SIGNIFICANT CHANGE UP
BACTERIA BLD CULT: SIGNIFICANT CHANGE UP

## 2018-11-05 PROBLEM — Z72.0 TOBACCO USE: Chronic | Status: ACTIVE | Noted: 2018-10-29

## 2018-11-05 PROBLEM — Z00.00 ENCOUNTER FOR PREVENTIVE HEALTH EXAMINATION: Status: ACTIVE | Noted: 2018-11-05

## 2018-11-07 ENCOUNTER — APPOINTMENT (OUTPATIENT)
Age: 78
End: 2018-11-07
Payer: COMMERCIAL

## 2018-11-07 VITALS
SYSTOLIC BLOOD PRESSURE: 138 MMHG | OXYGEN SATURATION: 96 % | TEMPERATURE: 98.7 F | RESPIRATION RATE: 16 BRPM | DIASTOLIC BLOOD PRESSURE: 70 MMHG | HEART RATE: 91 BPM

## 2018-11-07 DIAGNOSIS — G47.00 INSOMNIA, UNSPECIFIED: ICD-10-CM

## 2018-11-07 DIAGNOSIS — F17.200 NICOTINE DEPENDENCE, UNSPECIFIED, UNCOMPLICATED: ICD-10-CM

## 2018-11-07 PROCEDURE — 99349 HOME/RES VST EST MOD MDM 40: CPT

## 2018-11-07 RX ORDER — LISINOPRIL 10 MG/1
10 TABLET ORAL
Qty: 30 | Refills: 0 | Status: ACTIVE | COMMUNITY
Start: 2018-11-02

## 2018-11-07 RX ORDER — TIOTROPIUM BROMIDE 18 UG/1
18 CAPSULE ORAL; RESPIRATORY (INHALATION)
Qty: 30 | Refills: 0 | Status: ACTIVE | COMMUNITY
Start: 2018-11-02

## 2018-11-07 RX ORDER — BUDESONIDE AND FORMOTEROL FUMARATE DIHYDRATE 160; 4.5 UG/1; UG/1
160-4.5 AEROSOL RESPIRATORY (INHALATION)
Qty: 10 | Refills: 0 | Status: ACTIVE | COMMUNITY
Start: 2018-11-02

## 2018-11-07 RX ORDER — ISOSORBIDE MONONITRATE 30 MG/1
30 TABLET, EXTENDED RELEASE ORAL
Qty: 30 | Refills: 0 | Status: ACTIVE | COMMUNITY
Start: 2018-11-02

## 2018-11-07 RX ORDER — ALBUTEROL SULFATE 90 UG/1
108 (90 BASE) AEROSOL, METERED RESPIRATORY (INHALATION) EVERY 4 HOURS
Qty: 1 | Refills: 0 | Status: ACTIVE | COMMUNITY
Start: 2018-11-07 | End: 1900-01-01

## 2018-11-07 RX ORDER — IBUPROFEN 600 MG/1
600 TABLET, FILM COATED ORAL
Qty: 28 | Refills: 0 | Status: DISCONTINUED | COMMUNITY
Start: 2018-07-26

## 2018-11-07 RX ORDER — CARVEDILOL 6.25 MG/1
6.25 TABLET, FILM COATED ORAL
Qty: 60 | Refills: 0 | Status: ACTIVE | COMMUNITY
Start: 2018-11-02

## 2018-11-07 RX ORDER — MEGESTROL ACETATE 40 MG/1
40 TABLET ORAL
Qty: 15 | Refills: 0 | Status: ACTIVE | COMMUNITY
Start: 2018-10-18

## 2018-11-07 RX ORDER — AZITHROMYCIN 500 MG/1
500 TABLET, FILM COATED ORAL
Qty: 3 | Refills: 0 | Status: DISCONTINUED | COMMUNITY
Start: 2018-11-02

## 2018-11-07 RX ORDER — HYDRALAZINE HYDROCHLORIDE 25 MG/1
25 TABLET ORAL
Qty: 90 | Refills: 0 | Status: ACTIVE | COMMUNITY
Start: 2018-11-02

## 2018-11-07 RX ORDER — NICOTINE 21 MG/24HR
14 PATCH, TRANSDERMAL 24 HOURS TRANSDERMAL
Qty: 28 | Refills: 0 | Status: ACTIVE | COMMUNITY
Start: 2018-11-04

## 2018-11-07 NOTE — HISTORY OF PRESENT ILLNESS
[Other: _____] : [unfilled] [FreeTextEntry1] : "Hospital Follow up visit for SOB/CHF"  Pt is homebound due to limited endurance, SOB secondary to recent hospitalization. Pt is 78 year old female seen in home for c/o worsening  LE edema, weight gain, and SOB with exertion x 1 day. She continues with intermittent cough with white sputum s/p azithromycin for PNA. Denies dietary indiscretions, reports 32 oz fluid intake daily. Pt also reports she is measuring output estimated at 28 oz daily. Denies fever, dizziness, lightheadness, chest pain, palpitations, N/V/D/dysueia, decreased urine output.  Meds reviewed and pt is using symbicort QD instead of BID as prescribed. Home environment is cluttered, musty, smoke filled with dog in home. Clear paths for emergency exit available. Pt denies smoking and reports adherence to nicotine patch since d/c. [de-identified] : From [facility] Hospital Course: 79 y/o female with a PMHx of tobacco abuse but otherwise no significant PMHx presents to ED with dyspnea on exertion, orthopnea, and lower extremity edema in the setting of acute systolic heart failure with superimposed CAP. Also with significant recent unintentional weight loss.\par #Cardiomyopathy/acute systolic CHF\par Now euvolemic.\par Tolerating PO Lasix 40 BID, strict I/Os, daily standing weights\par On Coreg 6.25 BID, continue lisinopril\par On hydralazine and Imdur 30\par #PNA:\par Start Azithromycin 250 mg daily to complete a total of 7 days ABx\par #Unintentional weight loss\par Due to decreased appetite, improved with Megace.\par CT scan negative for malignancy.

## 2018-11-07 NOTE — HEALTH RISK ASSESSMENT
[No falls in past year] : Patient reported no falls in the past year [0] : 2) Feeling down, depressed, or hopeless: Not at all (0) [] : No [de-identified] : Smoking hx 1-1.5 packs of cigarettes for over 50 years. Pt has not smoked since hospitalization [[id number]] : 0

## 2018-11-07 NOTE — COUNSELING
[Healthy eating counseling provided] : healthy eating [Low Salt Diet] : Low salt diet [Needs reinforcement, provided] : Patient needs reinforcement on understanding lifestyle changes and  the steps needed to achieve self management goals and reinforcement was provided

## 2018-11-07 NOTE — PHYSICAL EXAM
[No Acute Distress] : no acute distress [Cachectic] : cachexia was observed [PERRL] : pupils equal round and reactive to light [EOMI] : extraocular movements intact [No JVD] : no jugular venous distention [Supple] : supple [No Respiratory Distress] : no respiratory distress  [Clear to Auscultation] : lungs were clear to auscultation bilaterally [No Accessory Muscle Use] : no accessory muscle use [Normal Rate] : normal rate  [Regular Rhythm] : with a regular rhythm [Normal S1, S2] : normal S1 and S2 [No Murmur] : no murmur heard [Soft] : abdomen soft [Non Tender] : non-tender [Non-distended] : non-distended [No HSM] : no HSM [Normal Bowel Sounds] : normal bowel sounds [Normal Gait] : normal gait [No Focal Deficits] : no focal deficits [de-identified] : + 3 edema b/l ankle

## 2018-11-08 ENCOUNTER — APPOINTMENT (OUTPATIENT)
Dept: CARDIOLOGY | Facility: CLINIC | Age: 78
End: 2018-11-08
Payer: COMMERCIAL

## 2018-11-08 VITALS
WEIGHT: 105 LBS | RESPIRATION RATE: 16 BRPM | SYSTOLIC BLOOD PRESSURE: 150 MMHG | OXYGEN SATURATION: 96 % | DIASTOLIC BLOOD PRESSURE: 72 MMHG | HEIGHT: 65 IN | BODY MASS INDEX: 17.49 KG/M2 | HEART RATE: 84 BPM

## 2018-11-08 PROCEDURE — 99215 OFFICE O/P EST HI 40 MIN: CPT

## 2018-11-08 PROCEDURE — 93000 ELECTROCARDIOGRAM COMPLETE: CPT

## 2018-11-08 RX ORDER — GUAIFENESIN AND DEXTROMETHORPHAN HYDROBROMIDE 1200; 60 MG/1; MG/1
60-1200 TABLET, EXTENDED RELEASE ORAL
Qty: 180 | Refills: 3 | Status: ACTIVE | COMMUNITY
Start: 2018-11-08 | End: 1900-01-01

## 2018-11-12 RX ORDER — FUROSEMIDE 40 MG/1
40 TABLET ORAL
Refills: 0 | Status: ACTIVE | COMMUNITY
Start: 2018-11-02

## 2018-11-13 ENCOUNTER — APPOINTMENT (OUTPATIENT)
Dept: PULMONOLOGY | Facility: CLINIC | Age: 78
End: 2018-11-13
Payer: COMMERCIAL

## 2018-11-13 VITALS
RESPIRATION RATE: 16 BRPM | DIASTOLIC BLOOD PRESSURE: 71 MMHG | TEMPERATURE: 98 F | OXYGEN SATURATION: 97 % | SYSTOLIC BLOOD PRESSURE: 129 MMHG | BODY MASS INDEX: 17.33 KG/M2 | HEIGHT: 65 IN | WEIGHT: 104 LBS | HEART RATE: 88 BPM

## 2018-11-13 DIAGNOSIS — Z87.01 PERSONAL HISTORY OF PNEUMONIA (RECURRENT): ICD-10-CM

## 2018-11-13 PROCEDURE — 99204 OFFICE O/P NEW MOD 45 MIN: CPT | Mod: 25

## 2018-11-13 PROCEDURE — G0008: CPT

## 2018-11-13 PROCEDURE — 90662 IIV NO PRSV INCREASED AG IM: CPT

## 2018-11-23 NOTE — PHYSICAL EXAM
[General Appearance - Well Developed] : well developed [Normal Appearance] : normal appearance [Well Groomed] : well groomed [General Appearance - Well Nourished] : well nourished [No Deformities] : no deformities [General Appearance - In No Acute Distress] : no acute distress [FreeTextEntry1] : thin elderly [Normal Conjunctiva] : the conjunctiva exhibited no abnormalities [Eyelids - No Xanthelasma] : the eyelids demonstrated no xanthelasmas [Normal Oral Mucosa] : normal oral mucosa [No Oral Pallor] : no oral pallor [No Oral Cyanosis] : no oral cyanosis [Normal Jugular Venous A Waves Present] : normal jugular venous A waves present [Normal Jugular Venous V Waves Present] : normal jugular venous V waves present [No Jugular Venous [place] A Waves] : no jugular venous goddard A waves [Respiration, Rhythm And Depth] : normal respiratory rhythm and effort [Exaggerated Use Of Accessory Muscles For Inspiration] : no accessory muscle use [Auscultation Breath Sounds / Voice Sounds] : lungs were clear to auscultation bilaterally [Heart Rate And Rhythm] : heart rate and rhythm were normal [Heart Sounds] : normal S1 and S2 [Murmurs] : no murmurs present [Abdomen Soft] : soft [Abdomen Tenderness] : non-tender [Abdomen Mass (___ Cm)] : no abdominal mass palpated [Abnormal Walk] : normal gait [Gait - Sufficient For Exercise Testing] : the gait was sufficient for exercise testing [Nail Clubbing] : no clubbing of the fingernails [Cyanosis, Localized] : no localized cyanosis [Petechial Hemorrhages (___cm)] : no petechial hemorrhages [Skin Color & Pigmentation] : normal skin color and pigmentation [] : no rash [No Venous Stasis] : no venous stasis [Skin Lesions] : no skin lesions [No Skin Ulcers] : no skin ulcer [No Xanthoma] : no  xanthoma was observed [Oriented To Time, Place, And Person] : oriented to person, place, and time [Affect] : the affect was normal [Mood] : the mood was normal [No Anxiety] : not feeling anxious

## 2018-11-23 NOTE — REASON FOR VISIT
[FreeTextEntry1] : Patient presents to establish care after recent [facility] admission. I had seen her in the office.  Since her discharge, she has remained stable without active complaints.\par \par She is a 79 yo woman with history of long-standing tobacco use but otherwise no significant PMHx except for recent profound unintentional weight loss who had presented to the [facility] ED with dyspnea on exertion, orthopnea, and lower extremity edema in the setting of acute systolic heart failure with superimposed CAP.  Also with significant recent unintentional weight loss.  Noted to have CM of unclear etiology which was deemed to be NICM.\par \par #Cardiomyopathy/acute systolic CHF \par Now euvolemic. \par Tolerating PO Lasix 40 BID, strict I/Os, daily standing weights \par On Coreg 6.25 BID, continue lisinopril \par On hydralazine and Imdur 30 \par \par #PNA: \par Start Azithromycin 250 mg daily to complete a total of 7 days ABx \par \par #Unintentional weight loss \par Due to decreased appetite, improved with Megace. \par CT scan negative for malignancy. \par

## 2018-11-29 ENCOUNTER — APPOINTMENT (OUTPATIENT)
Dept: GASTROENTEROLOGY | Facility: CLINIC | Age: 78
End: 2018-11-29

## 2019-01-02 NOTE — PHYSICAL THERAPY INITIAL EVALUATION ADULT - PATIENT/FAMILY/SIGNIFICANT OTHER GOALS STATEMENT, PT EVAL
goal to return home Partial Purse String (Intermediate) Text: Given the location of the defect and the characteristics of the surrounding skin an intermediate purse string closure was deemed most appropriate.  Undermining was performed circumfirentially around the surgical defect.  A purse string suture was then placed and tightened. Wound tension only allowed a partial closure of the circular defect.

## 2019-02-05 ENCOUNTER — APPOINTMENT (OUTPATIENT)
Dept: PULMONOLOGY | Facility: CLINIC | Age: 79
End: 2019-02-05
Payer: COMMERCIAL

## 2019-02-05 VITALS
SYSTOLIC BLOOD PRESSURE: 124 MMHG | HEART RATE: 78 BPM | OXYGEN SATURATION: 100 % | RESPIRATION RATE: 18 BRPM | DIASTOLIC BLOOD PRESSURE: 64 MMHG | WEIGHT: 106 LBS | HEIGHT: 65 IN | TEMPERATURE: 98.8 F | BODY MASS INDEX: 17.66 KG/M2

## 2019-02-05 PROCEDURE — 99214 OFFICE O/P EST MOD 30 MIN: CPT

## 2019-02-05 NOTE — HISTORY OF PRESENT ILLNESS
[FreeTextEntry1] : Patient doing well, never changed from symbicort/spiriva to anoro, but is very happy and improved on current regimen. No exacerbations since last visit. Now has more energy. Hasn't smoked since end of October

## 2019-02-05 NOTE — ASSESSMENT
[FreeTextEntry1] : COPD: Now doing well, weight improved, breathing stable, more energy. No change in treatment.

## 2019-02-05 NOTE — REVIEW OF SYSTEMS
[Fatigue] : fatigue [Poor Appetite] : poor appetite [Recent Wt Loss (___ Lbs)] : recent [unfilled] ~Ulb weight loss [Cough] : cough [Sputum] : sputum  [Dyspnea] : dyspnea [Hypertension] : ~T hypertension [Edema] : ~T edema was present [Depression] : depression [Difficulty Initiating Sleep] : difficulty falling asleep [Negative] : Pulmonary Hypertension [FreeTextEntry3] : during last 5-6 years, pt states it occurred as  fell ill and passed away last year [de-identified] : since  passed away

## 2019-02-05 NOTE — PHYSICAL EXAM
[General Appearance - Well Developed] : well developed [Normal Appearance] : normal appearance [Well Groomed] : well groomed [General Appearance - Well Nourished] : well nourished [No Deformities] : no deformities [General Appearance - In No Acute Distress] : no acute distress [Normal Conjunctiva] : the conjunctiva exhibited no abnormalities [Eyelids - No Xanthelasma] : the eyelids demonstrated no xanthelasmas [Normal Oropharynx] : normal oropharynx [Neck Appearance] : the appearance of the neck was normal [Neck Cervical Mass (___cm)] : no neck mass was observed [Jugular Venous Distention Increased] : there was no jugular-venous distention [Thyroid Diffuse Enlargement] : the thyroid was not enlarged [Thyroid Nodule] : there were no palpable thyroid nodules [Respiration, Rhythm And Depth] : normal respiratory rhythm and effort [Exaggerated Use Of Accessory Muscles For Inspiration] : no accessory muscle use [FreeTextEntry1] : mild wheezing in upper fields [Abdomen Soft] : soft [Abdomen Tenderness] : non-tender [] : no hepato-splenomegaly [Abdomen Mass (___ Cm)] : no abdominal mass palpated [Abnormal Walk] : normal gait [Gait - Sufficient For Exercise Testing] : the gait was sufficient for exercise testing [2+ Pitting] : 2+  pitting [Deep Tendon Reflexes (DTR)] : deep tendon reflexes were 2+ and symmetric [Sensation] : the sensory exam was normal to light touch and pinprick [No Focal Deficits] : no focal deficits [Oriented To Time, Place, And Person] : oriented to person, place, and time [Impaired Insight] : insight and judgment were intact [Affect] : the affect was normal [Over the Past 2 Weeks, Have You Felt Down, Depressed, or Hopeless?] : 1.) Over the past 2 weeks, have you felt down, depressed, or hopeless? No [Over the Past 2 Weeks, Have You Felt Little Interest or Pleasure Doing Things?] : 2.) Over the past 2 weeks, have you felt little interest or pleasure doing things? No

## 2019-02-14 ENCOUNTER — APPOINTMENT (OUTPATIENT)
Dept: CARDIOLOGY | Facility: CLINIC | Age: 79
End: 2019-02-14
Payer: COMMERCIAL

## 2019-02-14 VITALS
HEIGHT: 65 IN | SYSTOLIC BLOOD PRESSURE: 116 MMHG | OXYGEN SATURATION: 99 % | WEIGHT: 104 LBS | DIASTOLIC BLOOD PRESSURE: 69 MMHG | BODY MASS INDEX: 17.33 KG/M2 | HEART RATE: 86 BPM

## 2019-02-14 PROCEDURE — 99214 OFFICE O/P EST MOD 30 MIN: CPT

## 2019-02-14 PROCEDURE — 36415 COLL VENOUS BLD VENIPUNCTURE: CPT

## 2019-02-14 PROCEDURE — 93000 ELECTROCARDIOGRAM COMPLETE: CPT

## 2019-02-15 LAB
ALBUMIN SERPL ELPH-MCNC: 3.9 G/DL
ALP BLD-CCNC: 49 U/L
ALT SERPL-CCNC: 12 U/L
ANION GAP SERPL CALC-SCNC: 7 MMOL/L
AST SERPL-CCNC: 21 U/L
BASOPHILS # BLD AUTO: 0.02 K/UL
BASOPHILS NFR BLD AUTO: 0.3 %
BILIRUB SERPL-MCNC: 0.3 MG/DL
BUN SERPL-MCNC: 22 MG/DL
CALCIUM SERPL-MCNC: 9.6 MG/DL
CHLORIDE SERPL-SCNC: 99 MMOL/L
CHOLEST SERPL-MCNC: 149 MG/DL
CHOLEST/HDLC SERPL: 2.9 RATIO
CO2 SERPL-SCNC: 35 MMOL/L
CREAT SERPL-MCNC: 0.92 MG/DL
EOSINOPHIL # BLD AUTO: 0.07 K/UL
EOSINOPHIL NFR BLD AUTO: 1.1 %
GLUCOSE SERPL-MCNC: 114 MG/DL
HBA1C MFR BLD HPLC: 5.5 %
HCT VFR BLD CALC: 37.9 %
HDLC SERPL-MCNC: 51 MG/DL
HGB BLD-MCNC: 11.7 G/DL
IMM GRANULOCYTES NFR BLD AUTO: 0.3 %
LDLC SERPL CALC-MCNC: 90 MG/DL
LYMPHOCYTES # BLD AUTO: 1.41 K/UL
LYMPHOCYTES NFR BLD AUTO: 23 %
MAN DIFF?: NORMAL
MCHC RBC-ENTMCNC: 29 PG
MCHC RBC-ENTMCNC: 30.9 GM/DL
MCV RBC AUTO: 94 FL
MONOCYTES # BLD AUTO: 0.36 K/UL
MONOCYTES NFR BLD AUTO: 5.9 %
NEUTROPHILS # BLD AUTO: 4.26 K/UL
NEUTROPHILS NFR BLD AUTO: 69.4 %
NT-PROBNP SERPL-MCNC: 828 PG/ML
PLATELET # BLD AUTO: 214 K/UL
POTASSIUM SERPL-SCNC: 3.5 MMOL/L
PROT SERPL-MCNC: 7.2 G/DL
RBC # BLD: 4.03 M/UL
RBC # FLD: 15 %
SODIUM SERPL-SCNC: 141 MMOL/L
TRIGL SERPL-MCNC: 41 MG/DL
TSH SERPL-ACNC: 0.79 UIU/ML
WBC # FLD AUTO: 6.14 K/UL

## 2019-02-16 ENCOUNTER — NON-APPOINTMENT (OUTPATIENT)
Age: 79
End: 2019-02-16

## 2019-02-16 NOTE — REASON FOR VISIT
[FreeTextEntry1] : Patient presents for routine follow-up care. Since her last visit in November 2018, she has remained stable without active complaints.  She has no current complaints.  She appears euvolemic on exam.\par \par She is a 77 yo woman with history of long-standing tobacco use but otherwise no significant PMHx except for recent profound unintentional weight loss who had presented to the [facility] ED with dyspnea on exertion, orthopnea, and lower extremity edema in the setting of acute systolic heart failure with superimposed CAP.  Also with significant recent unintentional weight loss.  Noted to have CM of unclear etiology which was deemed to be NICM.\par \par #Cardiomyopathy/acute systolic CHF \par Now euvolemic. \par Tolerating PO Lasix 40 BID, strict I/Os, daily standing weights \par On Coreg 6.25 BID, continue lisinopril \par On hydralazine and Imdur 30 \par \par #PNA: \par Start Azithromycin 250 mg daily to complete a total of 7 days ABx \par \par #Unintentional weight loss \par Due to decreased appetite, improved with Megace. \par CT scan negative for malignancy. \par

## 2019-05-16 ENCOUNTER — APPOINTMENT (OUTPATIENT)
Dept: CARDIOLOGY | Facility: CLINIC | Age: 79
End: 2019-05-16
Payer: COMMERCIAL

## 2019-05-16 VITALS
BODY MASS INDEX: 18.33 KG/M2 | HEIGHT: 65 IN | OXYGEN SATURATION: 97 % | DIASTOLIC BLOOD PRESSURE: 54 MMHG | WEIGHT: 110 LBS | HEART RATE: 77 BPM | SYSTOLIC BLOOD PRESSURE: 130 MMHG

## 2019-05-16 PROCEDURE — 93000 ELECTROCARDIOGRAM COMPLETE: CPT

## 2019-05-16 PROCEDURE — 99214 OFFICE O/P EST MOD 30 MIN: CPT

## 2019-07-03 ENCOUNTER — RX RENEWAL (OUTPATIENT)
Age: 79
End: 2019-07-03

## 2019-07-26 ENCOUNTER — APPOINTMENT (OUTPATIENT)
Dept: PULMONOLOGY | Facility: CLINIC | Age: 79
End: 2019-07-26
Payer: COMMERCIAL

## 2019-07-26 VITALS
HEART RATE: 88 BPM | SYSTOLIC BLOOD PRESSURE: 170 MMHG | WEIGHT: 109 LBS | BODY MASS INDEX: 18.16 KG/M2 | TEMPERATURE: 97.7 F | RESPIRATION RATE: 16 BRPM | DIASTOLIC BLOOD PRESSURE: 80 MMHG | HEIGHT: 65 IN

## 2019-07-26 PROCEDURE — ZZZZZ: CPT

## 2019-07-26 PROCEDURE — 94060 EVALUATION OF WHEEZING: CPT

## 2019-07-26 PROCEDURE — 94729 DIFFUSING CAPACITY: CPT

## 2019-07-26 PROCEDURE — 94726 PLETHYSMOGRAPHY LUNG VOLUMES: CPT

## 2019-07-26 PROCEDURE — 99214 OFFICE O/P EST MOD 30 MIN: CPT | Mod: 25

## 2019-07-26 NOTE — REVIEW OF SYSTEMS
[Fatigue] : fatigue [Poor Appetite] : poor appetite [Recent Wt Loss (___ Lbs)] : recent [unfilled] ~Ulb weight loss [Cough] : cough [Dyspnea] : dyspnea [Sputum] : sputum  [Hypertension] : ~T hypertension [Edema] : ~T edema was present [Difficulty Initiating Sleep] : difficulty falling asleep [Depression] : depression [Negative] : Pulmonary Hypertension [de-identified] : since  passed away [FreeTextEntry3] : during last 5-6 years, pt states it occurred as  fell ill and passed away last year

## 2019-07-26 NOTE — PHYSICAL EXAM
[Well Groomed] : well groomed [Normal Appearance] : normal appearance [General Appearance - Well Developed] : well developed [General Appearance - In No Acute Distress] : no acute distress [General Appearance - Well Nourished] : well nourished [No Deformities] : no deformities [Eyelids - No Xanthelasma] : the eyelids demonstrated no xanthelasmas [Normal Conjunctiva] : the conjunctiva exhibited no abnormalities [Normal Oropharynx] : normal oropharynx [Neck Cervical Mass (___cm)] : no neck mass was observed [Neck Appearance] : the appearance of the neck was normal [Thyroid Diffuse Enlargement] : the thyroid was not enlarged [Thyroid Nodule] : there were no palpable thyroid nodules [Jugular Venous Distention Increased] : there was no jugular-venous distention [Respiration, Rhythm And Depth] : normal respiratory rhythm and effort [Abdomen Soft] : soft [Exaggerated Use Of Accessory Muscles For Inspiration] : no accessory muscle use [FreeTextEntry1] : mild wheezing in upper fields [Abdomen Tenderness] : non-tender [Abdomen Mass (___ Cm)] : no abdominal mass palpated [] : no hepato-splenomegaly [Gait - Sufficient For Exercise Testing] : the gait was sufficient for exercise testing [2+ Pitting] : 2+  pitting [Abnormal Walk] : normal gait [Sensation] : the sensory exam was normal to light touch and pinprick [Deep Tendon Reflexes (DTR)] : deep tendon reflexes were 2+ and symmetric [No Focal Deficits] : no focal deficits [Affect] : the affect was normal [Impaired Insight] : insight and judgment were intact [Oriented To Time, Place, And Person] : oriented to person, place, and time [Over the Past 2 Weeks, Have You Felt Down, Depressed, or Hopeless?] : 1.) Over the past 2 weeks, have you felt down, depressed, or hopeless? No [Over the Past 2 Weeks, Have You Felt Little Interest or Pleasure Doing Things?] : 2.) Over the past 2 weeks, have you felt little interest or pleasure doing things? No

## 2019-07-26 NOTE — ASSESSMENT
[FreeTextEntry1] : 1. COPD: Now with change in sputum but no definite exacerbation\par \par 2. Smoking: Will refer to [facility].

## 2019-07-26 NOTE — PHYSICAL EXAM
[Well Groomed] : well groomed [Normal Appearance] : normal appearance [General Appearance - Well Developed] : well developed [General Appearance - In No Acute Distress] : no acute distress [No Deformities] : no deformities [General Appearance - Well Nourished] : well nourished [Normal Conjunctiva] : the conjunctiva exhibited no abnormalities [Eyelids - No Xanthelasma] : the eyelids demonstrated no xanthelasmas [Normal Oropharynx] : normal oropharynx [Neck Cervical Mass (___cm)] : no neck mass was observed [Neck Appearance] : the appearance of the neck was normal [Thyroid Nodule] : there were no palpable thyroid nodules [Thyroid Diffuse Enlargement] : the thyroid was not enlarged [Jugular Venous Distention Increased] : there was no jugular-venous distention [Respiration, Rhythm And Depth] : normal respiratory rhythm and effort [Exaggerated Use Of Accessory Muscles For Inspiration] : no accessory muscle use [Abdomen Soft] : soft [FreeTextEntry1] : mild wheezing in upper fields [Abdomen Tenderness] : non-tender [Abdomen Mass (___ Cm)] : no abdominal mass palpated [] : no hepato-splenomegaly [Gait - Sufficient For Exercise Testing] : the gait was sufficient for exercise testing [Abnormal Walk] : normal gait [2+ Pitting] : 2+  pitting [Deep Tendon Reflexes (DTR)] : deep tendon reflexes were 2+ and symmetric [Sensation] : the sensory exam was normal to light touch and pinprick [No Focal Deficits] : no focal deficits [Affect] : the affect was normal [Oriented To Time, Place, And Person] : oriented to person, place, and time [Impaired Insight] : insight and judgment were intact [Over the Past 2 Weeks, Have You Felt Down, Depressed, or Hopeless?] : 1.) Over the past 2 weeks, have you felt down, depressed, or hopeless? No [Over the Past 2 Weeks, Have You Felt Little Interest or Pleasure Doing Things?] : 2.) Over the past 2 weeks, have you felt little interest or pleasure doing things? No

## 2019-07-26 NOTE — REVIEW OF SYSTEMS
[Fatigue] : fatigue [Poor Appetite] : poor appetite [Recent Wt Loss (___ Lbs)] : recent [unfilled] ~Ulb weight loss [Cough] : cough [Sputum] : sputum  [Dyspnea] : dyspnea [Hypertension] : ~T hypertension [Edema] : ~T edema was present [Difficulty Initiating Sleep] : difficulty falling asleep [Depression] : depression [Negative] : Pulmonary Hypertension [FreeTextEntry3] : during last 5-6 years, pt states it occurred as  fell ill and passed away last year [de-identified] : since  passed away

## 2019-07-26 NOTE — HISTORY OF PRESENT ILLNESS
[FreeTextEntry1] : Patient has noted a change in the color of her phlegm for the past few days, No other new symptoms. Still taking spiriva and symbicort. Still smoking!

## 2019-08-15 ENCOUNTER — RX RENEWAL (OUTPATIENT)
Age: 79
End: 2019-08-15

## 2019-08-15 RX ORDER — VARENICLINE TARTRATE 0.5 (11)-1
0.5 MG X 11 & KIT ORAL
Qty: 1 | Refills: 0 | Status: ACTIVE | COMMUNITY
Start: 2019-05-16 | End: 1900-01-01

## 2019-08-24 ENCOUNTER — EMERGENCY (EMERGENCY)
Facility: HOSPITAL | Age: 79
LOS: 1 days | Discharge: HOME | End: 2019-08-24
Attending: EMERGENCY MEDICINE | Admitting: EMERGENCY MEDICINE
Payer: COMMERCIAL

## 2019-08-24 VITALS
SYSTOLIC BLOOD PRESSURE: 129 MMHG | TEMPERATURE: 100 F | HEART RATE: 80 BPM | DIASTOLIC BLOOD PRESSURE: 70 MMHG | RESPIRATION RATE: 16 BRPM | OXYGEN SATURATION: 100 %

## 2019-08-24 LAB
ALBUMIN SERPL ELPH-MCNC: 3.9 G/DL — SIGNIFICANT CHANGE UP (ref 3.3–5)
ALP SERPL-CCNC: 42 U/L — SIGNIFICANT CHANGE UP (ref 40–120)
ALT FLD-CCNC: SIGNIFICANT CHANGE UP U/L (ref 4–33)
ANION GAP SERPL CALC-SCNC: 13 MMO/L — SIGNIFICANT CHANGE UP (ref 7–14)
AST SERPL-CCNC: SIGNIFICANT CHANGE UP U/L (ref 4–32)
BILIRUB SERPL-MCNC: 0.3 MG/DL — SIGNIFICANT CHANGE UP (ref 0.2–1.2)
BUN SERPL-MCNC: 10 MG/DL — SIGNIFICANT CHANGE UP (ref 7–23)
CALCIUM SERPL-MCNC: 8.9 MG/DL — SIGNIFICANT CHANGE UP (ref 8.4–10.5)
CHLORIDE SERPL-SCNC: 98 MMOL/L — SIGNIFICANT CHANGE UP (ref 98–107)
CO2 SERPL-SCNC: 26 MMOL/L — SIGNIFICANT CHANGE UP (ref 22–31)
CREAT SERPL-MCNC: 0.79 MG/DL — SIGNIFICANT CHANGE UP (ref 0.5–1.3)
GLUCOSE SERPL-MCNC: 105 MG/DL — HIGH (ref 70–99)
HCT VFR BLD CALC: 37 % — SIGNIFICANT CHANGE UP (ref 34.5–45)
HGB BLD-MCNC: 11.9 G/DL — SIGNIFICANT CHANGE UP (ref 11.5–15.5)
MCHC RBC-ENTMCNC: 28.9 PG — SIGNIFICANT CHANGE UP (ref 27–34)
MCHC RBC-ENTMCNC: 32.2 % — SIGNIFICANT CHANGE UP (ref 32–36)
MCV RBC AUTO: 89.8 FL — SIGNIFICANT CHANGE UP (ref 80–100)
NRBC # FLD: 0 K/UL — SIGNIFICANT CHANGE UP (ref 0–0)
NT-PROBNP SERPL-SCNC: 775.7 PG/ML — SIGNIFICANT CHANGE UP
PLATELET # BLD AUTO: 211 K/UL — SIGNIFICANT CHANGE UP (ref 150–400)
PMV BLD: 9.5 FL — SIGNIFICANT CHANGE UP (ref 7–13)
POTASSIUM SERPL-MCNC: SIGNIFICANT CHANGE UP MMOL/L (ref 3.5–5.3)
POTASSIUM SERPL-SCNC: SIGNIFICANT CHANGE UP MMOL/L (ref 3.5–5.3)
PROT SERPL-MCNC: SIGNIFICANT CHANGE UP G/DL (ref 6–8.3)
RBC # BLD: 4.12 M/UL — SIGNIFICANT CHANGE UP (ref 3.8–5.2)
RBC # FLD: 14.8 % — HIGH (ref 10.3–14.5)
SODIUM SERPL-SCNC: 137 MMOL/L — SIGNIFICANT CHANGE UP (ref 135–145)
TROPONIN T, HIGH SENSITIVITY: 16 NG/L — SIGNIFICANT CHANGE UP (ref ?–14)
TROPONIN T, HIGH SENSITIVITY: 18 NG/L — SIGNIFICANT CHANGE UP (ref ?–14)
WBC # BLD: 6.4 K/UL — SIGNIFICANT CHANGE UP (ref 3.8–10.5)
WBC # FLD AUTO: 6.4 K/UL — SIGNIFICANT CHANGE UP (ref 3.8–10.5)

## 2019-08-24 PROCEDURE — 99284 EMERGENCY DEPT VISIT MOD MDM: CPT

## 2019-08-24 PROCEDURE — 71046 X-RAY EXAM CHEST 2 VIEWS: CPT | Mod: 26

## 2019-08-24 NOTE — ED PROVIDER NOTE - NSFOLLOWUPINSTRUCTIONS_ED_ALL_ED_FT
Follow up with your primary care doctor and cardiologist within the next 3-5 days for re-evaluation and continued care.  Return to the ER immediately if you experience the following issues: severe chest pain, difficulty breathing. Composite Graft Text: The defect edges were debeveled with a #15 scalpel blade.  Given the location of the defect, shape of the defect, the proximity to free margins and the fact the defect was full thickness a composite graft was deemed most appropriate.  The defect was outline and then transferred to the donor site.  A full thickness graft was then excised from the donor site. The graft was then placed in the primary defect, oriented appropriately and then sutured into place.  The secondary defect was then repaired using a primary closure.

## 2019-08-24 NOTE — ED ADULT NURSE NOTE - NSIMPLEMENTINTERV_GEN_ALL_ED
Implemented All Universal Safety Interventions:  [place] to call system. Call bell, personal items and telephone within reach. Instruct patient to call for assistance. Room bathroom lighting operational. Non-slip footwear when patient is off stretcher. Physically safe environment: no spills, clutter or unnecessary equipment. Stretcher in lowest position, wheels locked, appropriate side rails in place.

## 2019-08-24 NOTE — ED PROVIDER NOTE - ATTENDING CONTRIBUTION TO CARE
agree with resident  "78F w/ pmh CHF (lasix 40 BID), COPD- p/w bilat LE swelling since yesterday. Reports has been taking meds per normal. No leg pain. No fever, n/v/d/c, chest / abd pain, cough, dizziness, dysuria/hematuria. No recent travel, medication change, illness, or hospitalization. "    PE: well appearing; VSS: CTAB/L; s1 s2 no m/r/g abd soft/NT/ND ext: mild pitting edema    Imp: mild fluid retention; will give IV lasix and likely d/c no signs of fluid overload (pulmonary)

## 2019-08-24 NOTE — ED PROVIDER NOTE - PHYSICAL EXAMINATION
*GEN:   comfortable, in no acute distress, AOx3  *EYES:   pupils equally round and reactive to light, extra-occular movements intact  *HEENT:   airway patent, moist mucosal membranes, full ROM neck  *CV:   regular rate and rhythm  *RESP:   clear to auscultation bilaterally, non-labored  *ABD:   soft, non-tender  *:   no cva/flank tenderness  *EXTREM:   bilat LE pitting edema w/out tenderness to palpation throughout, no MSK tenderness, full ROM throughout  *SKIN:   dry, intact  *NEURO:   AOx3, no focal weakness or loss of sensation

## 2019-08-24 NOTE — ED PROVIDER NOTE - OBJECTIVE STATEMENT
78F w/ pmh CHF (lasix 40 BID), COPD- p/w bilat LE swelling 78F w/ pmh CHF (lasix 40 BID), COPD- p/w bilat LE swelling since yesterday. Reports has been taking meds per normal. No leg pain. No fever, n/v/d/c, chest / abd pain, cough, dizziness, dysuria/hematuria. No recent travel, medication change, illness, or hospitalization.     Cards: Jun Valencia

## 2019-08-24 NOTE — ED ADULT NURSE NOTE - OBJECTIVE STATEMENT
Patient brought to room 2 for c/o bilateral foot edema. Pt is comfortable and has no c/o shortness of breath or pain. IVL placed to right arm 20 gauge and labs drawn and sent.   ELMA Garcia

## 2019-08-29 ENCOUNTER — APPOINTMENT (OUTPATIENT)
Dept: CARDIOLOGY | Facility: CLINIC | Age: 79
End: 2019-08-29
Payer: COMMERCIAL

## 2019-08-29 VITALS
OXYGEN SATURATION: 96 % | SYSTOLIC BLOOD PRESSURE: 155 MMHG | HEIGHT: 65 IN | HEART RATE: 67 BPM | BODY MASS INDEX: 17.83 KG/M2 | DIASTOLIC BLOOD PRESSURE: 66 MMHG | WEIGHT: 107 LBS

## 2019-08-29 PROCEDURE — 93000 ELECTROCARDIOGRAM COMPLETE: CPT

## 2019-08-29 PROCEDURE — 99215 OFFICE O/P EST HI 40 MIN: CPT

## 2019-09-18 ENCOUNTER — OUTPATIENT (OUTPATIENT)
Dept: OUTPATIENT SERVICES | Facility: HOSPITAL | Age: 79
LOS: 1 days | End: 2019-09-18

## 2019-09-18 ENCOUNTER — APPOINTMENT (OUTPATIENT)
Dept: CV DIAGNOSITCS | Facility: HOSPITAL | Age: 79
End: 2019-09-18
Payer: COMMERCIAL

## 2019-09-18 DIAGNOSIS — I50.9 HEART FAILURE, UNSPECIFIED: ICD-10-CM

## 2019-09-18 PROCEDURE — 93306 TTE W/DOPPLER COMPLETE: CPT | Mod: 26

## 2019-10-11 ENCOUNTER — APPOINTMENT (OUTPATIENT)
Dept: PULMONOLOGY | Facility: CLINIC | Age: 79
End: 2019-10-11
Payer: COMMERCIAL

## 2019-10-11 VITALS
WEIGHT: 104 LBS | RESPIRATION RATE: 17 BRPM | DIASTOLIC BLOOD PRESSURE: 65 MMHG | OXYGEN SATURATION: 97 % | HEIGHT: 65 IN | TEMPERATURE: 97.5 F | SYSTOLIC BLOOD PRESSURE: 120 MMHG | HEART RATE: 80 BPM | BODY MASS INDEX: 17.33 KG/M2

## 2019-10-11 PROCEDURE — 99214 OFFICE O/P EST MOD 30 MIN: CPT

## 2019-10-11 NOTE — ASSESSMENT
[FreeTextEntry1] : 1. COPD: Symptoms stable to mildly improved. Dyspnea on exertion, some cough but less than before. Continue Symbicort, Spiriva as directed. Patient received flu shot from PCP on 10/1. return to office in 4 months.\par \par 2. Smoking cessation: Continue treatment and therapy as directed by [facility].

## 2019-10-11 NOTE — END OF VISIT
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 I agree with his history, physical and assessment.\par

## 2019-10-11 NOTE — HISTORY OF PRESENT ILLNESS
[FreeTextEntry1] : Since last visit she started at the [facility] and was able to quit smoking on 9/29, with only 1 cigarette smoked last Thursday. She is using NRT patches and gum to aide in quitting. She is compliant with Symbicort and Spiriva. She is very satisfied with the program the [facility] offers.

## 2019-10-11 NOTE — PHYSICAL EXAM
[General Appearance - Well Developed] : well developed [Well Groomed] : well groomed [Normal Appearance] : normal appearance [No Deformities] : no deformities [General Appearance - Well Nourished] : well nourished [General Appearance - In No Acute Distress] : no acute distress [Normal Conjunctiva] : the conjunctiva exhibited no abnormalities [Normal Oropharynx] : normal oropharynx [Eyelids - No Xanthelasma] : the eyelids demonstrated no xanthelasmas [Neck Appearance] : the appearance of the neck was normal [Neck Cervical Mass (___cm)] : no neck mass was observed [Jugular Venous Distention Increased] : there was no jugular-venous distention [Thyroid Nodule] : there were no palpable thyroid nodules [Thyroid Diffuse Enlargement] : the thyroid was not enlarged [Respiration, Rhythm And Depth] : normal respiratory rhythm and effort [Exaggerated Use Of Accessory Muscles For Inspiration] : no accessory muscle use [Abdomen Soft] : soft [Abdomen Tenderness] : non-tender [] : no hepato-splenomegaly [Abdomen Mass (___ Cm)] : no abdominal mass palpated [Gait - Sufficient For Exercise Testing] : the gait was sufficient for exercise testing [Abnormal Walk] : normal gait [2+ Pitting] : 2+  pitting [Deep Tendon Reflexes (DTR)] : deep tendon reflexes were 2+ and symmetric [Sensation] : the sensory exam was normal to light touch and pinprick [No Focal Deficits] : no focal deficits [Oriented To Time, Place, And Person] : oriented to person, place, and time [Impaired Insight] : insight and judgment were intact [Affect] : the affect was normal [Over the Past 2 Weeks, Have You Felt Down, Depressed, or Hopeless?] : 1.) Over the past 2 weeks, have you felt down, depressed, or hopeless? No [Over the Past 2 Weeks, Have You Felt Little Interest or Pleasure Doing Things?] : 2.) Over the past 2 weeks, have you felt little interest or pleasure doing things? No

## 2019-10-11 NOTE — REVIEW OF SYSTEMS
[Fatigue] : fatigue [Poor Appetite] : poor appetite [Recent Wt Loss (___ Lbs)] : recent [unfilled] ~Ulb weight loss [As Noted in HPI] : as noted in HPI [Cough] : cough [Sputum] : sputum  [Dyspnea] : dyspnea [Hypertension] : ~T hypertension [Edema] : ~T edema was present [Depression] : depression [Difficulty Initiating Sleep] : difficulty falling asleep [Negative] : Pulmonary Hypertension [FreeTextEntry3] : during last 5-6 years, pt states it occurred as  fell ill and passed away last year [de-identified] : since  passed away

## 2019-11-20 ENCOUNTER — APPOINTMENT (OUTPATIENT)
Dept: CARDIOLOGY | Facility: CLINIC | Age: 79
End: 2019-11-20
Payer: COMMERCIAL

## 2019-11-20 ENCOUNTER — NON-APPOINTMENT (OUTPATIENT)
Age: 79
End: 2019-11-20

## 2019-11-20 VITALS — WEIGHT: 103 LBS | BODY MASS INDEX: 17.14 KG/M2

## 2019-11-20 VITALS
HEIGHT: 65 IN | OXYGEN SATURATION: 98 % | DIASTOLIC BLOOD PRESSURE: 56 MMHG | BODY MASS INDEX: 17.14 KG/M2 | SYSTOLIC BLOOD PRESSURE: 102 MMHG | HEART RATE: 71 BPM

## 2019-11-20 PROCEDURE — 93000 ELECTROCARDIOGRAM COMPLETE: CPT

## 2019-11-20 PROCEDURE — 99214 OFFICE O/P EST MOD 30 MIN: CPT

## 2020-01-01 ENCOUNTER — APPOINTMENT (OUTPATIENT)
Dept: PULMONOLOGY | Facility: CLINIC | Age: 80
End: 2020-01-01
Payer: COMMERCIAL

## 2020-01-01 ENCOUNTER — INPATIENT (INPATIENT)
Facility: HOSPITAL | Age: 80
LOS: 0 days | End: 2020-12-08
Attending: ORTHOPAEDIC SURGERY | Admitting: ORTHOPAEDIC SURGERY
Payer: COMMERCIAL

## 2020-01-01 ENCOUNTER — EMERGENCY (EMERGENCY)
Facility: HOSPITAL | Age: 80
LOS: 1 days | Discharge: HOME | End: 2020-01-01
Attending: EMERGENCY MEDICINE | Admitting: EMERGENCY MEDICINE
Payer: COMMERCIAL

## 2020-01-01 ENCOUNTER — APPOINTMENT (OUTPATIENT)
Dept: PULMONOLOGY | Facility: CLINIC | Age: 80
End: 2020-01-01

## 2020-01-01 ENCOUNTER — APPOINTMENT (OUTPATIENT)
Dept: CARDIOLOGY | Facility: CLINIC | Age: 80
End: 2020-01-01
Payer: COMMERCIAL

## 2020-01-01 ENCOUNTER — TRANSCRIPTION ENCOUNTER (OUTPATIENT)
Age: 80
End: 2020-01-01

## 2020-01-01 ENCOUNTER — APPOINTMENT (OUTPATIENT)
Dept: DERMATOLOGY | Facility: CLINIC | Age: 80
End: 2020-01-01
Payer: COMMERCIAL

## 2020-01-01 ENCOUNTER — NON-APPOINTMENT (OUTPATIENT)
Age: 80
End: 2020-01-01

## 2020-01-01 ENCOUNTER — RESULT REVIEW (OUTPATIENT)
Age: 80
End: 2020-01-01

## 2020-01-01 ENCOUNTER — APPOINTMENT (OUTPATIENT)
Dept: CV DIAGNOSITCS | Facility: HOSPITAL | Age: 80
End: 2020-01-01
Payer: COMMERCIAL

## 2020-01-01 ENCOUNTER — OUTPATIENT (OUTPATIENT)
Dept: OUTPATIENT SERVICES | Facility: HOSPITAL | Age: 80
LOS: 1 days | End: 2020-01-01

## 2020-01-01 VITALS
OXYGEN SATURATION: 100 % | DIASTOLIC BLOOD PRESSURE: 64 MMHG | SYSTOLIC BLOOD PRESSURE: 107 MMHG | TEMPERATURE: 98 F | HEART RATE: 94 BPM | RESPIRATION RATE: 20 BRPM

## 2020-01-01 VITALS
HEIGHT: 65 IN | BODY MASS INDEX: 18.33 KG/M2 | OXYGEN SATURATION: 97 % | RESPIRATION RATE: 18 BRPM | DIASTOLIC BLOOD PRESSURE: 56 MMHG | HEART RATE: 78 BPM | WEIGHT: 110 LBS | TEMPERATURE: 98.4 F | SYSTOLIC BLOOD PRESSURE: 107 MMHG

## 2020-01-01 VITALS
WEIGHT: 106 LBS | OXYGEN SATURATION: 98 % | HEART RATE: 69 BPM | SYSTOLIC BLOOD PRESSURE: 105 MMHG | HEIGHT: 65 IN | DIASTOLIC BLOOD PRESSURE: 56 MMHG | BODY MASS INDEX: 17.66 KG/M2 | RESPIRATION RATE: 18 BRPM | TEMPERATURE: 97.5 F

## 2020-01-01 VITALS
SYSTOLIC BLOOD PRESSURE: 156 MMHG | HEART RATE: 68 BPM | WEIGHT: 108 LBS | DIASTOLIC BLOOD PRESSURE: 69 MMHG | TEMPERATURE: 97.4 F | OXYGEN SATURATION: 98 % | BODY MASS INDEX: 17.99 KG/M2 | HEIGHT: 65 IN

## 2020-01-01 VITALS
SYSTOLIC BLOOD PRESSURE: 95 MMHG | OXYGEN SATURATION: 99 % | HEART RATE: 74 BPM | HEIGHT: 65 IN | DIASTOLIC BLOOD PRESSURE: 56 MMHG | TEMPERATURE: 98.3 F

## 2020-01-01 VITALS
HEIGHT: 65 IN | DIASTOLIC BLOOD PRESSURE: 72 MMHG | HEART RATE: 82 BPM | RESPIRATION RATE: 18 BRPM | SYSTOLIC BLOOD PRESSURE: 92 MMHG | OXYGEN SATURATION: 100 % | TEMPERATURE: 98 F

## 2020-01-01 VITALS
TEMPERATURE: 98 F | DIASTOLIC BLOOD PRESSURE: 64 MMHG | SYSTOLIC BLOOD PRESSURE: 120 MMHG | HEIGHT: 65 IN | RESPIRATION RATE: 16 BRPM | OXYGEN SATURATION: 95 % | HEART RATE: 100 BPM

## 2020-01-01 VITALS — BODY MASS INDEX: 15.75 KG/M2 | HEIGHT: 66 IN | WEIGHT: 98 LBS

## 2020-01-01 VITALS — BODY MASS INDEX: 17.97 KG/M2 | WEIGHT: 108 LBS

## 2020-01-01 DIAGNOSIS — I50.9 HEART FAILURE, UNSPECIFIED: ICD-10-CM

## 2020-01-01 DIAGNOSIS — I50.22 CHRONIC SYSTOLIC (CONGESTIVE) HEART FAILURE: ICD-10-CM

## 2020-01-01 DIAGNOSIS — Z78.9 OTHER SPECIFIED HEALTH STATUS: ICD-10-CM

## 2020-01-01 DIAGNOSIS — S72.001A FRACTURE OF UNSPECIFIED PART OF NECK OF RIGHT FEMUR, INITIAL ENCOUNTER FOR CLOSED FRACTURE: ICD-10-CM

## 2020-01-01 DIAGNOSIS — Z13.6 ENCOUNTER FOR SCREENING FOR CARDIOVASCULAR DISORDERS: ICD-10-CM

## 2020-01-01 DIAGNOSIS — J44.9 CHRONIC OBSTRUCTIVE PULMONARY DISEASE, UNSPECIFIED: ICD-10-CM

## 2020-01-01 DIAGNOSIS — S72.009A FRACTURE OF UNSPECIFIED PART OF NECK OF UNSPECIFIED FEMUR, INITIAL ENCOUNTER FOR CLOSED FRACTURE: ICD-10-CM

## 2020-01-01 DIAGNOSIS — Z77.22 CONTACT WITH AND (SUSPECTED) EXPOSURE TO ENVIRONMENTAL TOBACCO SMOKE (ACUTE) (CHRONIC): ICD-10-CM

## 2020-01-01 DIAGNOSIS — I42.8 OTHER CARDIOMYOPATHIES: ICD-10-CM

## 2020-01-01 DIAGNOSIS — N17.9 ACUTE KIDNEY FAILURE, UNSPECIFIED: ICD-10-CM

## 2020-01-01 DIAGNOSIS — Z87.2 PERSONAL HISTORY OF DISEASES OF THE SKIN AND SUBCUTANEOUS TISSUE: ICD-10-CM

## 2020-01-01 DIAGNOSIS — Z87.891 PERSONAL HISTORY OF NICOTINE DEPENDENCE: ICD-10-CM

## 2020-01-01 DIAGNOSIS — D72.829 ELEVATED WHITE BLOOD CELL COUNT, UNSPECIFIED: ICD-10-CM

## 2020-01-01 DIAGNOSIS — I10 ESSENTIAL (PRIMARY) HYPERTENSION: ICD-10-CM

## 2020-01-01 DIAGNOSIS — B02.29 OTHER POSTHERPETIC NERVOUS SYSTEM INVOLVEMENT: ICD-10-CM

## 2020-01-01 LAB
ALBUMIN SERPL ELPH-MCNC: 2 G/DL — LOW (ref 3.3–5)
ALBUMIN SERPL ELPH-MCNC: 3.6 G/DL — SIGNIFICANT CHANGE UP (ref 3.3–5)
ALP SERPL-CCNC: 50 U/L — SIGNIFICANT CHANGE UP (ref 40–120)
ALP SERPL-CCNC: 52 U/L — SIGNIFICANT CHANGE UP (ref 40–120)
ALT FLD-CCNC: 20 U/L — SIGNIFICANT CHANGE UP (ref 4–33)
ALT FLD-CCNC: 20 U/L — SIGNIFICANT CHANGE UP (ref 4–33)
ANION GAP SERPL CALC-SCNC: 13 MMOL/L — SIGNIFICANT CHANGE UP (ref 7–14)
ANION GAP SERPL CALC-SCNC: 17 MMOL/L — HIGH (ref 7–14)
ANION GAP SERPL CALC-SCNC: 7 MMOL/L — SIGNIFICANT CHANGE UP (ref 7–14)
APTT BLD: 22.5 SEC — LOW (ref 27–36.3)
AST SERPL-CCNC: 28 U/L — SIGNIFICANT CHANGE UP (ref 4–32)
AST SERPL-CCNC: 36 U/L — HIGH (ref 4–32)
BASOPHILS # BLD AUTO: 0.02 K/UL — SIGNIFICANT CHANGE UP (ref 0–0.2)
BASOPHILS NFR BLD AUTO: 0.1 % — SIGNIFICANT CHANGE UP (ref 0–2)
BILIRUB SERPL-MCNC: 0.4 MG/DL — SIGNIFICANT CHANGE UP (ref 0.2–1.2)
BILIRUB SERPL-MCNC: <0.2 MG/DL — SIGNIFICANT CHANGE UP (ref 0.2–1.2)
BLD GP AB SCN SERPL QL: NEGATIVE — SIGNIFICANT CHANGE UP
BLD GP AB SCN SERPL QL: NEGATIVE — SIGNIFICANT CHANGE UP
BLOOD GAS ARTERIAL - LYTES,HGB,ICA,LACT RESULT: SIGNIFICANT CHANGE UP
BLOOD GAS ARTERIAL - LYTES,HGB,ICA,LACT RESULT: SIGNIFICANT CHANGE UP
BLOOD GAS ARTERIAL COMPREHENSIVE RESULT: SIGNIFICANT CHANGE UP
BUN SERPL-MCNC: 32 MG/DL — HIGH (ref 7–23)
BUN SERPL-MCNC: 34 MG/DL — HIGH (ref 7–23)
BUN SERPL-MCNC: 35 MG/DL — HIGH (ref 7–23)
CALCIUM SERPL-MCNC: 7.3 MG/DL — LOW (ref 8.4–10.5)
CALCIUM SERPL-MCNC: 9.4 MG/DL — SIGNIFICANT CHANGE UP (ref 8.4–10.5)
CALCIUM SERPL-MCNC: 9.7 MG/DL — SIGNIFICANT CHANGE UP (ref 8.4–10.5)
CHLORIDE SERPL-SCNC: 108 MMOL/L — HIGH (ref 98–107)
CHLORIDE SERPL-SCNC: 97 MMOL/L — LOW (ref 98–107)
CHLORIDE SERPL-SCNC: 99 MMOL/L — SIGNIFICANT CHANGE UP (ref 98–107)
CO2 SERPL-SCNC: 26 MMOL/L — SIGNIFICANT CHANGE UP (ref 22–31)
CO2 SERPL-SCNC: 29 MMOL/L — SIGNIFICANT CHANGE UP (ref 22–31)
CO2 SERPL-SCNC: 33 MMOL/L — HIGH (ref 22–31)
CREAT SERPL-MCNC: 1.56 MG/DL — HIGH (ref 0.5–1.3)
CREAT SERPL-MCNC: 1.85 MG/DL — HIGH (ref 0.5–1.3)
CREAT SERPL-MCNC: 1.99 MG/DL — HIGH (ref 0.5–1.3)
EOSINOPHIL # BLD AUTO: 0.02 K/UL — SIGNIFICANT CHANGE UP (ref 0–0.5)
EOSINOPHIL NFR BLD AUTO: 0.1 % — SIGNIFICANT CHANGE UP (ref 0–6)
GLUCOSE SERPL-MCNC: 117 MG/DL — HIGH (ref 70–99)
GLUCOSE SERPL-MCNC: 132 MG/DL — HIGH (ref 70–99)
GLUCOSE SERPL-MCNC: 156 MG/DL — HIGH (ref 70–99)
HCT VFR BLD CALC: 27.6 % — LOW (ref 34.5–45)
HCT VFR BLD CALC: 35.3 % — SIGNIFICANT CHANGE UP (ref 34.5–45)
HCT VFR BLD CALC: 39.2 % — SIGNIFICANT CHANGE UP (ref 34.5–45)
HGB BLD-MCNC: 11.5 G/DL — SIGNIFICANT CHANGE UP (ref 11.5–15.5)
HGB BLD-MCNC: 12.1 G/DL — SIGNIFICANT CHANGE UP (ref 11.5–15.5)
HGB BLD-MCNC: 8.5 G/DL — LOW (ref 11.5–15.5)
IANC: 12.94 K/UL — HIGH (ref 1.5–8.5)
IMM GRANULOCYTES NFR BLD AUTO: 0.5 % — SIGNIFICANT CHANGE UP (ref 0–1.5)
INR BLD: 1.02 RATIO — SIGNIFICANT CHANGE UP (ref 0.88–1.17)
INR BLD: 1.1 RATIO — SIGNIFICANT CHANGE UP (ref 0.88–1.17)
LYMPHOCYTES # BLD AUTO: 1.27 K/UL — SIGNIFICANT CHANGE UP (ref 1–3.3)
LYMPHOCYTES # BLD AUTO: 8.6 % — LOW (ref 13–44)
MCHC RBC-ENTMCNC: 28.4 PG — SIGNIFICANT CHANGE UP (ref 27–34)
MCHC RBC-ENTMCNC: 29.1 PG — SIGNIFICANT CHANGE UP (ref 27–34)
MCHC RBC-ENTMCNC: 29.4 PG — SIGNIFICANT CHANGE UP (ref 27–34)
MCHC RBC-ENTMCNC: 30.8 GM/DL — LOW (ref 32–36)
MCHC RBC-ENTMCNC: 30.9 GM/DL — LOW (ref 32–36)
MCHC RBC-ENTMCNC: 32.6 GM/DL — SIGNIFICANT CHANGE UP (ref 32–36)
MCV RBC AUTO: 89.4 FL — SIGNIFICANT CHANGE UP (ref 80–100)
MCV RBC AUTO: 92 FL — SIGNIFICANT CHANGE UP (ref 80–100)
MCV RBC AUTO: 95.5 FL — SIGNIFICANT CHANGE UP (ref 80–100)
MONOCYTES # BLD AUTO: 0.5 K/UL — SIGNIFICANT CHANGE UP (ref 0–0.9)
MONOCYTES NFR BLD AUTO: 3.4 % — SIGNIFICANT CHANGE UP (ref 2–14)
NEUTROPHILS # BLD AUTO: 12.94 K/UL — HIGH (ref 1.8–7.4)
NEUTROPHILS NFR BLD AUTO: 87.3 % — HIGH (ref 43–77)
NRBC # BLD: 0 /100 WBCS — SIGNIFICANT CHANGE UP
NRBC # FLD: 0 K/UL — SIGNIFICANT CHANGE UP
NRBC # FLD: 0 K/UL — SIGNIFICANT CHANGE UP
NRBC # FLD: 0.05 K/UL — HIGH
PLATELET # BLD AUTO: 147 K/UL — LOW (ref 150–400)
PLATELET # BLD AUTO: 227 K/UL — SIGNIFICANT CHANGE UP (ref 150–400)
PLATELET # BLD AUTO: 242 K/UL — SIGNIFICANT CHANGE UP (ref 150–400)
POTASSIUM SERPL-MCNC: 5 MMOL/L — SIGNIFICANT CHANGE UP (ref 3.5–5.3)
POTASSIUM SERPL-MCNC: 5.4 MMOL/L — HIGH (ref 3.5–5.3)
POTASSIUM SERPL-MCNC: SIGNIFICANT CHANGE UP MMOL/L (ref 3.5–5.3)
POTASSIUM SERPL-MCNC: SIGNIFICANT CHANGE UP MMOL/L (ref 3.5–5.3)
POTASSIUM SERPL-SCNC: 5 MMOL/L — SIGNIFICANT CHANGE UP (ref 3.5–5.3)
POTASSIUM SERPL-SCNC: 5.4 MMOL/L — HIGH (ref 3.5–5.3)
POTASSIUM SERPL-SCNC: SIGNIFICANT CHANGE UP MMOL/L (ref 3.5–5.3)
POTASSIUM SERPL-SCNC: SIGNIFICANT CHANGE UP MMOL/L (ref 3.5–5.3)
PROT SERPL-MCNC: 4.2 G/DL — LOW (ref 6–8.3)
PROT SERPL-MCNC: 7.5 G/DL — SIGNIFICANT CHANGE UP (ref 6–8.3)
PROTHROM AB SERPL-ACNC: 11.6 SEC — SIGNIFICANT CHANGE UP (ref 9.8–13.1)
PROTHROM AB SERPL-ACNC: 12.6 SEC — SIGNIFICANT CHANGE UP (ref 9.8–13.1)
RBC # BLD: 2.89 M/UL — LOW (ref 3.8–5.2)
RBC # BLD: 3.95 M/UL — SIGNIFICANT CHANGE UP (ref 3.8–5.2)
RBC # BLD: 4.26 M/UL — SIGNIFICANT CHANGE UP (ref 3.8–5.2)
RBC # FLD: 15.9 % — HIGH (ref 10.3–14.5)
RBC # FLD: 15.9 % — HIGH (ref 10.3–14.5)
RBC # FLD: 16.1 % — HIGH (ref 10.3–14.5)
RH IG SCN BLD-IMP: POSITIVE — SIGNIFICANT CHANGE UP
RH IG SCN BLD-IMP: POSITIVE — SIGNIFICANT CHANGE UP
SARS-COV-2 RNA SPEC QL NAA+PROBE: SIGNIFICANT CHANGE UP
SODIUM SERPL-SCNC: 137 MMOL/L — SIGNIFICANT CHANGE UP (ref 135–145)
SODIUM SERPL-SCNC: 141 MMOL/L — SIGNIFICANT CHANGE UP (ref 135–145)
SODIUM SERPL-SCNC: 151 MMOL/L — HIGH (ref 135–145)
WBC # BLD: 10.35 K/UL — SIGNIFICANT CHANGE UP (ref 3.8–10.5)
WBC # BLD: 14.83 K/UL — HIGH (ref 3.8–10.5)
WBC # BLD: 15.91 K/UL — HIGH (ref 3.8–10.5)
WBC # FLD AUTO: 10.35 K/UL — SIGNIFICANT CHANGE UP (ref 3.8–10.5)
WBC # FLD AUTO: 14.83 K/UL — HIGH (ref 3.8–10.5)
WBC # FLD AUTO: 15.91 K/UL — HIGH (ref 3.8–10.5)

## 2020-01-01 PROCEDURE — 88304 TISSUE EXAM BY PATHOLOGIST: CPT | Mod: 26

## 2020-01-01 PROCEDURE — 99053 MED SERV 10PM-8AM 24 HR FAC: CPT

## 2020-01-01 PROCEDURE — 99252 IP/OBS CONSLTJ NEW/EST SF 35: CPT

## 2020-01-01 PROCEDURE — 99223 1ST HOSP IP/OBS HIGH 75: CPT

## 2020-01-01 PROCEDURE — 99214 OFFICE O/P EST MOD 30 MIN: CPT

## 2020-01-01 PROCEDURE — 73502 X-RAY EXAM HIP UNI 2-3 VIEWS: CPT | Mod: 26,RT

## 2020-01-01 PROCEDURE — 73552 X-RAY EXAM OF FEMUR 2/>: CPT | Mod: 26,RT

## 2020-01-01 PROCEDURE — 99233 SBSQ HOSP IP/OBS HIGH 50: CPT | Mod: 57

## 2020-01-01 PROCEDURE — 99285 EMERGENCY DEPT VISIT HI MDM: CPT

## 2020-01-01 PROCEDURE — 76376 3D RENDER W/INTRP POSTPROCES: CPT | Mod: 26

## 2020-01-01 PROCEDURE — 27236 TREAT THIGH FRACTURE: CPT | Mod: RT

## 2020-01-01 PROCEDURE — 73700 CT LOWER EXTREMITY W/O DYE: CPT | Mod: 26,RT

## 2020-01-01 PROCEDURE — 99203 OFFICE O/P NEW LOW 30 MIN: CPT | Mod: GC

## 2020-01-01 PROCEDURE — 93306 TTE W/DOPPLER COMPLETE: CPT | Mod: 26

## 2020-01-01 PROCEDURE — 93000 ELECTROCARDIOGRAM COMPLETE: CPT

## 2020-01-01 PROCEDURE — 12345: CPT | Mod: NC

## 2020-01-01 PROCEDURE — 72170 X-RAY EXAM OF PELVIS: CPT | Mod: 26

## 2020-01-01 PROCEDURE — 88311 DECALCIFY TISSUE: CPT | Mod: 26

## 2020-01-01 PROCEDURE — 71045 X-RAY EXAM CHEST 1 VIEW: CPT | Mod: 26

## 2020-01-01 PROCEDURE — 99283 EMERGENCY DEPT VISIT LOW MDM: CPT | Mod: GC

## 2020-01-01 RX ORDER — SODIUM CHLORIDE 9 MG/ML
1000 INJECTION INTRAMUSCULAR; INTRAVENOUS; SUBCUTANEOUS
Refills: 0 | Status: DISCONTINUED | OUTPATIENT
Start: 2020-01-01 | End: 2020-12-09

## 2020-01-01 RX ORDER — NICOTINE POLACRILEX 2 MG
1 GUM BUCCAL DAILY
Refills: 0 | Status: DISCONTINUED | OUTPATIENT
Start: 2020-01-01 | End: 2020-12-09

## 2020-01-01 RX ORDER — OXYCODONE HYDROCHLORIDE 5 MG/1
2.5 TABLET ORAL EVERY 4 HOURS
Refills: 0 | Status: DISCONTINUED | OUTPATIENT
Start: 2020-01-01 | End: 2020-12-09

## 2020-01-01 RX ORDER — CARVEDILOL PHOSPHATE 80 MG/1
6.25 CAPSULE, EXTENDED RELEASE ORAL EVERY 12 HOURS
Refills: 0 | Status: DISCONTINUED | OUTPATIENT
Start: 2020-01-01 | End: 2020-12-09

## 2020-01-01 RX ORDER — PANTOPRAZOLE SODIUM 20 MG/1
40 TABLET, DELAYED RELEASE ORAL
Refills: 0 | Status: DISCONTINUED | OUTPATIENT
Start: 2020-12-09 | End: 2020-12-09

## 2020-01-01 RX ORDER — POVIDONE-IODINE 5 %
1 AEROSOL (ML) TOPICAL ONCE
Refills: 0 | Status: COMPLETED | OUTPATIENT
Start: 2020-01-01 | End: 2020-01-01

## 2020-01-01 RX ORDER — MORPHINE SULFATE 50 MG/1
4 CAPSULE, EXTENDED RELEASE ORAL ONCE
Refills: 0 | Status: DISCONTINUED | OUTPATIENT
Start: 2020-01-01 | End: 2020-01-01

## 2020-01-01 RX ORDER — HYDRALAZINE HCL 50 MG
25 TABLET ORAL EVERY 8 HOURS
Refills: 0 | Status: DISCONTINUED | OUTPATIENT
Start: 2020-01-01 | End: 2020-12-09

## 2020-01-01 RX ORDER — ACETAMINOPHEN 500 MG
975 TABLET ORAL EVERY 8 HOURS
Refills: 0 | Status: DISCONTINUED | OUTPATIENT
Start: 2020-01-01 | End: 2020-12-09

## 2020-01-01 RX ORDER — MAGNESIUM HYDROXIDE 400 MG/1
30 TABLET, CHEWABLE ORAL DAILY
Refills: 0 | Status: DISCONTINUED | OUTPATIENT
Start: 2020-01-01 | End: 2020-12-09

## 2020-01-01 RX ORDER — GABAPENTIN 400 MG/1
300 CAPSULE ORAL
Refills: 0 | Status: DISCONTINUED | OUTPATIENT
Start: 2020-01-01 | End: 2020-12-09

## 2020-01-01 RX ORDER — OXYCODONE HYDROCHLORIDE 5 MG/1
5 TABLET ORAL EVERY 4 HOURS
Refills: 0 | Status: DISCONTINUED | OUTPATIENT
Start: 2020-01-01 | End: 2020-12-09

## 2020-01-01 RX ORDER — FUROSEMIDE 40 MG
40 TABLET ORAL
Refills: 0 | Status: DISCONTINUED | OUTPATIENT
Start: 2020-01-01 | End: 2020-01-01

## 2020-01-01 RX ORDER — TIOTROPIUM BROMIDE 18 UG/1
1 CAPSULE ORAL; RESPIRATORY (INHALATION) DAILY
Refills: 0 | Status: DISCONTINUED | OUTPATIENT
Start: 2020-01-01 | End: 2020-12-09

## 2020-01-01 RX ORDER — PANTOPRAZOLE SODIUM 20 MG/1
40 TABLET, DELAYED RELEASE ORAL ONCE
Refills: 0 | Status: COMPLETED | OUTPATIENT
Start: 2020-01-01 | End: 2020-01-01

## 2020-01-01 RX ORDER — SENNA PLUS 8.6 MG/1
2 TABLET ORAL AT BEDTIME
Refills: 0 | Status: DISCONTINUED | OUTPATIENT
Start: 2020-01-01 | End: 2020-12-09

## 2020-01-01 RX ORDER — HEPARIN SODIUM 5000 [USP'U]/ML
5000 INJECTION INTRAVENOUS; SUBCUTANEOUS ONCE
Refills: 0 | Status: COMPLETED | OUTPATIENT
Start: 2020-01-01 | End: 2020-01-01

## 2020-01-01 RX ORDER — ISOSORBIDE MONONITRATE 60 MG/1
30 TABLET, EXTENDED RELEASE ORAL DAILY
Refills: 0 | Status: DISCONTINUED | OUTPATIENT
Start: 2020-01-01 | End: 2020-12-09

## 2020-01-01 RX ORDER — BUDESONIDE AND FORMOTEROL FUMARATE DIHYDRATE 160; 4.5 UG/1; UG/1
2 AEROSOL RESPIRATORY (INHALATION)
Refills: 0 | Status: DISCONTINUED | OUTPATIENT
Start: 2020-01-01 | End: 2020-12-09

## 2020-01-01 RX ORDER — POLYETHYLENE GLYCOL 3350 17 G/17G
17 POWDER, FOR SOLUTION ORAL DAILY
Refills: 0 | Status: DISCONTINUED | OUTPATIENT
Start: 2020-12-09 | End: 2020-12-09

## 2020-01-01 RX ORDER — ASCORBIC ACID 60 MG
500 TABLET,CHEWABLE ORAL DAILY
Refills: 0 | Status: DISCONTINUED | OUTPATIENT
Start: 2020-12-09 | End: 2020-12-09

## 2020-01-01 RX ORDER — SODIUM CHLORIDE 9 MG/ML
1000 INJECTION INTRAMUSCULAR; INTRAVENOUS; SUBCUTANEOUS
Refills: 0 | Status: DISCONTINUED | OUTPATIENT
Start: 2020-01-01 | End: 2020-01-01

## 2020-01-01 RX ORDER — LISINOPRIL 2.5 MG/1
10 TABLET ORAL DAILY
Refills: 0 | Status: DISCONTINUED | OUTPATIENT
Start: 2020-01-01 | End: 2020-01-01

## 2020-01-01 RX ORDER — CHLORHEXIDINE GLUCONATE 213 G/1000ML
1 SOLUTION TOPICAL ONCE
Refills: 0 | Status: COMPLETED | OUTPATIENT
Start: 2020-01-01 | End: 2020-01-01

## 2020-01-01 RX ORDER — LANOLIN ALCOHOL/MO/W.PET/CERES
3 CREAM (GRAM) TOPICAL AT BEDTIME
Refills: 0 | Status: DISCONTINUED | OUTPATIENT
Start: 2020-01-01 | End: 2020-12-09

## 2020-01-01 RX ORDER — ACYCLOVIR SODIUM 500 MG
1 VIAL (EA) INTRAVENOUS
Qty: 35 | Refills: 0
Start: 2020-01-01 | End: 2020-01-01

## 2020-01-01 RX ADMIN — OXYCODONE HYDROCHLORIDE 5 MILLIGRAM(S): 5 TABLET ORAL at 08:54

## 2020-01-01 RX ADMIN — OXYCODONE HYDROCHLORIDE 5 MILLIGRAM(S): 5 TABLET ORAL at 23:48

## 2020-01-01 RX ADMIN — Medication 975 MILLIGRAM(S): at 06:09

## 2020-01-01 RX ADMIN — CHLORHEXIDINE GLUCONATE 1 APPLICATION(S): 213 SOLUTION TOPICAL at 10:30

## 2020-01-01 RX ADMIN — SODIUM CHLORIDE 100 MILLILITER(S): 9 INJECTION INTRAMUSCULAR; INTRAVENOUS; SUBCUTANEOUS at 23:47

## 2020-01-01 RX ADMIN — SODIUM CHLORIDE 100 MILLILITER(S): 9 INJECTION INTRAMUSCULAR; INTRAVENOUS; SUBCUTANEOUS at 09:00

## 2020-01-01 RX ADMIN — CARVEDILOL PHOSPHATE 6.25 MILLIGRAM(S): 80 CAPSULE, EXTENDED RELEASE ORAL at 06:09

## 2020-01-01 RX ADMIN — Medication 1 APPLICATION(S): at 11:15

## 2020-01-01 RX ADMIN — OXYCODONE HYDROCHLORIDE 5 MILLIGRAM(S): 5 TABLET ORAL at 09:55

## 2020-01-01 RX ADMIN — BUDESONIDE AND FORMOTEROL FUMARATE DIHYDRATE 2 PUFF(S): 160; 4.5 AEROSOL RESPIRATORY (INHALATION) at 11:16

## 2020-01-01 RX ADMIN — Medication 25 MILLIGRAM(S): at 06:09

## 2020-01-01 RX ADMIN — PANTOPRAZOLE SODIUM 40 MILLIGRAM(S): 20 TABLET, DELAYED RELEASE ORAL at 11:16

## 2020-01-01 RX ADMIN — GABAPENTIN 300 MILLIGRAM(S): 400 CAPSULE ORAL at 06:09

## 2020-01-01 RX ADMIN — TIOTROPIUM BROMIDE 1 CAPSULE(S): 18 CAPSULE ORAL; RESPIRATORY (INHALATION) at 11:17

## 2020-01-01 RX ADMIN — MORPHINE SULFATE 4 MILLIGRAM(S): 50 CAPSULE, EXTENDED RELEASE ORAL at 18:32

## 2020-01-01 RX ADMIN — GABAPENTIN 300 MILLIGRAM(S): 400 CAPSULE ORAL at 17:41

## 2020-01-01 RX ADMIN — Medication 975 MILLIGRAM(S): at 13:23

## 2020-02-14 NOTE — PHYSICAL EXAM
[General Appearance - Well Developed] : well developed [Normal Appearance] : normal appearance [Well Groomed] : well groomed [No Deformities] : no deformities [General Appearance - Well Nourished] : well nourished [General Appearance - In No Acute Distress] : no acute distress [Eyelids - No Xanthelasma] : the eyelids demonstrated no xanthelasmas [Normal Conjunctiva] : the conjunctiva exhibited no abnormalities [Normal Oropharynx] : normal oropharynx [Neck Cervical Mass (___cm)] : no neck mass was observed [Neck Appearance] : the appearance of the neck was normal [Jugular Venous Distention Increased] : there was no jugular-venous distention [Thyroid Diffuse Enlargement] : the thyroid was not enlarged [Thyroid Nodule] : there were no palpable thyroid nodules [Respiration, Rhythm And Depth] : normal respiratory rhythm and effort [Exaggerated Use Of Accessory Muscles For Inspiration] : no accessory muscle use [Abdomen Soft] : soft [Abdomen Tenderness] : non-tender [Abdomen Mass (___ Cm)] : no abdominal mass palpated [] : no hepato-splenomegaly [Abnormal Walk] : normal gait [Gait - Sufficient For Exercise Testing] : the gait was sufficient for exercise testing [2+ Pitting] : 2+  pitting [No Focal Deficits] : no focal deficits [Deep Tendon Reflexes (DTR)] : deep tendon reflexes were 2+ and symmetric [Sensation] : the sensory exam was normal to light touch and pinprick [Impaired Insight] : insight and judgment were intact [Oriented To Time, Place, And Person] : oriented to person, place, and time [Affect] : the affect was normal [Over the Past 2 Weeks, Have You Felt Down, Depressed, or Hopeless?] : 1.) Over the past 2 weeks, have you felt down, depressed, or hopeless? No [Over the Past 2 Weeks, Have You Felt Little Interest or Pleasure Doing Things?] : 2.) Over the past 2 weeks, have you felt little interest or pleasure doing things? No

## 2020-02-14 NOTE — ASSESSMENT
[FreeTextEntry1] : 1. COPD: Symptoms continue to be stable. Continue Symbicort, Spiriva as directed. Return to office in 6 months.\par \par 2. Smoking cessation: Continue treatment and therapy as directed by [facility].

## 2020-02-14 NOTE — REVIEW OF SYSTEMS
[Fatigue] : fatigue [Poor Appetite] : poor appetite [Recent Wt Loss (___ Lbs)] : recent [unfilled] ~Ulb weight loss [As Noted in HPI] : as noted in HPI [Cough] : cough [Sputum] : sputum  [Dyspnea] : dyspnea [Hypertension] : ~T hypertension [Depression] : depression [Edema] : ~T edema was present [Difficulty Initiating Sleep] : difficulty falling asleep [Negative] : Pulmonary Hypertension [FreeTextEntry3] : during last 5-6 years, pt states it occurred as  fell ill and passed away last year [de-identified] : since  passed away

## 2020-02-14 NOTE — HISTORY OF PRESENT ILLNESS
[FreeTextEntry1] : Patient continues to do very well. Continues to be tobacco-free and attends [facility]. No exacerbations since last visit. Denies pulmonary symptoms at this time, compliant with Symbicort BID and Spiriva once daily.

## 2020-08-03 PROBLEM — J44.9 CHRONIC OBSTRUCTIVE PULMONARY DISEASE, UNSPECIFIED COPD TYPE: Status: ACTIVE | Noted: 2018-11-07

## 2020-08-03 NOTE — REVIEW OF SYSTEMS
[As Noted in HPI] : as noted in HPI [Dyspnea] : dyspnea [Depression] : depression [Hypertension] : ~T hypertension [Edema] : ~T edema was present [Difficulty Initiating Sleep] : difficulty falling asleep [Negative] : Pulmonary Hypertension [de-identified] : since  passed away

## 2020-08-03 NOTE — END OF VISIT
[Time Spent: ___ minutes] : I have spent [unfilled] minutes of time on the encounter. 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 I agree with his history, physical and assessment.\par

## 2020-08-03 NOTE — HISTORY OF PRESENT ILLNESS
[TextBox_4] : Patient continues to do very well. Continues to be tobacco-free for 11 months. No exacerbations or OCS requirement over the past year. Denies pulmonary symptoms at this time, compliant with Symbicort BID and Spiriva once daily.\par \par [facility] remains closed, however has been available to patient as resource if needed.

## 2020-08-03 NOTE — ASSESSMENT
[FreeTextEntry1] : 1. COPD: Symptoms continue to be stable. Continue Symbicort, Spiriva as directed. Return to office in 6 months.\par \par 2. Smoking cessation: Has not smoked in nearly a year, no desire to resume at this time.\par \par 3. Vaccinations: Patient states she has had PNA vaccines and will obtain influenza from PCP when available this Fall.\par \par I, Ran Marquez NP, am scribing for and in the presence of Dr. Hernesto Benitez, the following sections HISTORY OF PRESENT ILLNESS, PAST MEDICAL/FAMILY/SOCIAL HISTORY; REVIEW OF SYSTEMS; VITAL SIGNS; PHYSICAL EXAM; DISPOSITION.

## 2020-08-03 NOTE — PHYSICAL EXAM
[General Appearance - Well Developed] : well developed [Well Groomed] : well groomed [Normal Appearance] : normal appearance [No Deformities] : no deformities [General Appearance - Well Nourished] : well nourished [Normal Conjunctiva] : the conjunctiva exhibited no abnormalities [General Appearance - In No Acute Distress] : no acute distress [Eyelids - No Xanthelasma] : the eyelids demonstrated no xanthelasmas [Normal Oropharynx] : normal oropharynx [Neck Appearance] : the appearance of the neck was normal [Neck Cervical Mass (___cm)] : no neck mass was observed [Jugular Venous Distention Increased] : there was no jugular-venous distention [Thyroid Diffuse Enlargement] : the thyroid was not enlarged [Thyroid Nodule] : there were no palpable thyroid nodules [Respiration, Rhythm And Depth] : normal respiratory rhythm and effort [Auscultation Breath Sounds / Voice Sounds] : lungs were clear to auscultation bilaterally [Exaggerated Use Of Accessory Muscles For Inspiration] : no accessory muscle use [Abdomen Tenderness] : non-tender [Abdomen Soft] : soft [] : no hepato-splenomegaly [Abnormal Walk] : normal gait [Abdomen Mass (___ Cm)] : no abdominal mass palpated [Gait - Sufficient For Exercise Testing] : the gait was sufficient for exercise testing [2+ Pitting] : 2+  pitting [No Focal Deficits] : no focal deficits [Deep Tendon Reflexes (DTR)] : deep tendon reflexes were 2+ and symmetric [Sensation] : the sensory exam was normal to light touch and pinprick [Oriented To Time, Place, And Person] : oriented to person, place, and time [Impaired Insight] : insight and judgment were intact [Affect] : the affect was normal [Over the Past 2 Weeks, Have You Felt Down, Depressed, or Hopeless?] : 1.) Over the past 2 weeks, have you felt down, depressed, or hopeless? No [Over the Past 2 Weeks, Have You Felt Little Interest or Pleasure Doing Things?] : 2.) Over the past 2 weeks, have you felt little interest or pleasure doing things? No

## 2020-08-13 NOTE — ED PROVIDER NOTE - CADM POA CENTRAL LINE
Past Medical History:   Diagnosis Date    Chronic kidney disease, stage 3     Diabetes mellitus     Diabetes mellitus, type 2     Hypertension     UTI (urinary tract infection) 2020       Past Surgical History:   Procedure Laterality Date    CATHETERIZATION OF URETER Bilateral 2020    Procedure: CATHETERIZATION, URETER;  Surgeon: Kvng Cunningham MD;  [facility];  Service: Urology;  Laterality: Bilateral;     SECTION, CLASSIC      x2    CHOLECYSTECTOMY      COL[facility] SURGERY      [facility]    COLONOSCOPY N/A 2018    Procedure: COLONOSCOPY;  Surgeon: Gibran Aguayo MD;  Location: [facility];  Service: Endoscopy;  Laterality: N/A;    COLOSTOMY Left     CYSTOSCOPY N/A 2020    Procedure: CYSTOSCOPY;  Surgeon: Kvng Cunningham MD;  [facility];  Service: Urology;  Laterality: N/A;    CYSTOSCOPY WITH URETEROSCOPY, RETROGRADE PYELOGRAPHY, AND INSERTION OF STENT Left 2019    Procedure: CYSTOSCOPY, WITH RETROGRADE PYELOGRAM AND URETERAL STENT INSERTION with placement of a muir cath;  Surgeon: Ulisses Horton MD;  Location: [facility] OR;  Service: General;  Laterality: Left;    ILEOSTOMY  2020    Procedure: CREATION, ILEOSTOMY;  Surgeon: Fabiana Valiente MD;  [facility];  Service: Colon and Rectal;;    INCISION AND DRAINAGE OF ABSCESS N/A 2019    Procedure: INCISION AND DRAINAGE, ABSCESS;  Surgeon: Ulisses Horton MD;  Location: [facility] OR;  Service: General;  Laterality: N/A;    INCISION OF ABDOMINAL WALL N/A 8/10/2018    Procedure: INCISION, ABDOMINAL WALL;  Surgeon: Ulisses Horton MD;  Location: [facility] OR;  Service: General;  Laterality: N/A;    INCISIONAL HERNIA REPAIR Right     LOW ANTERIOR RESECTION OF COLON N/A 2020    Procedure: RESECTION, COLON, LOW ANTERIOR;  Surgeon: Fabiana Valiente MD;  [facility];  Service: Colon and Rectal;  Laterality: N/A;    LYSIS OF  ADHESIONS  2020    Procedure: LYSIS, ADHESIONS;  Surgeon: Fabiana Valiente MD;  Location: [facility] OR [address] [place] [address];  Service: Colon and Rectal;;       Review of patient's allergies indicates:   Allergen Reactions    Chlorhexidine gluconate Rash     Chlorhexidine/alcohol wipes. Rash and blistering.       Medications:  Medications Prior to Admission   Medication Sig    carvedilol (COREG) 25 MG tablet Take 1 tablet (25 mg total) by mouth once daily.    acetaminophen (TYLENOL) 325 MG tablet Take 2 tablets (650 mg total) by mouth every 8 (eight) hours as needed.    betamethasone valerate 0.1% (VALISONE) 0.1 % Crea Apply around wound as needed daily for itching.    blood sugar diagnostic Strp Use to test blood sugar twice daily as directed.    blood-glucose meter Misc Use as directed to test blood sugar twice daily    [] cefTRIAXone (ROCEPHIN) 1 gram injection Inject 1 g into the vein once daily. End date 2020 for 13 days    clotrimazole-betamethasone 1-0.05% (LOTRISONE) cream Apply topically locally as directed    dicyclomine (BENTYL) 10 MG capsule TAKE 1 CAPSULE BY MOUTH THREE TIMES A DAY    gentamicin (GARAMYCIN) 0.1 % ointment Apply topically to affected area daily    hydroCHLOROthiazide (HYDRODIURIL) 25 MG tablet Take 1 tablet (25 mg total) by mouth once daily.    HYDROcodone-acetaminophen (NORCO) 5-325 mg per tablet Take 1 tablet by mouth every 4 (four) hours as needed.    insulin glargine 100 units/mL (3mL) SubQ pen Inject subcutaneously into the skin 10 units every morning.    lancets 30 gauge Misc Use to test blood sugar twice daily.    metFORMIN (GLUCOPHAGE) 1000 MG tablet Take 1 tablet (1,000 mg total) by mouth 2 (two) times daily.    metoclopramide HCl (REGLAN) 10 MG tablet Take 1 tablet (10 mg total) by mouth 4 (four) times daily.    naproxen (NAPROSYN) 500 MG tablet Take 1 tablet (500 mg total) by mouth 2 (two) times daily with meals.    ondansetron (ZOFRAN) 8 MG tablet Take 1  "tablet (8 mg total) by mouth 2 (two) times daily as needed. (Patient taking differently: Take 8 mg by mouth every 8 (eight) hours as needed for Nausea. )    pen needle, diabetic (BD ULTRA-FINE ONEIL PEN NEEDLE) 32 gauge x 5/32" Ndle Use to inject insulin daily    TRUETEST TEST STRIPS Strp      Antibiotics (From admission, onward)    Start     Stop Route Frequency Ordered    20 1530  meropenem (MERREM) 2 g in sodium chloride 0.9% 100 mL IVPB      -- IV Every 8 hours (non-standard times) 20 1429    20  mupirocin 2 % ointment      2059 Nasl 2 times daily 20        Antifungals (From admission, onward)    None        Antivirals (From admission, onward)    None           Immunization History   Administered Date(s) Administered    Influenza - High Dose - PF (65 years and older) 2020    Pneumococcal Conjugate - 13 Valent 2016       Family History     Problem Relation (Age of Onset)    Alcohol abuse Father    Diabetes Sister, Brother    Early death Grandchild    Heart disease Mother, Sister    Hyperlipidemia Mother, Sister    Hypertension Mother, Sister    Stroke Mother, Sister        Social History     Socioeconomic History    Marital status: Single     Spouse name: Not on file    Number of children: Not on file    Years of education: Not on file    Highest education level: Not on file   Occupational History    Not on file   Social Needs    Financial resource strain: Not on file    Food insecurity     Worry: Not on file     Inability: Not on file    Transportation needs     Medical: Not on file     Non-medical: Not on file   Tobacco Use    Smoking status: Former Smoker     Types: Cigarettes     Quit date: 2000     Years since quittin.6    Smokeless tobacco: Never Used   Substance and Sexual Activity    Alcohol use: No    Drug use: No    Sexual activity: Not Currently   Lifestyle    Physical activity     Days per week: Not on file     Minutes per " session: Not on file    Stress: Not on file   Relationships    Social connections     Talks on phone: Not on file     Gets together: Not on file     Attends Sikhism service: Not on file     Active member of club or organization: Not on file     Attends meetings of clubs or organizations: Not on file     Relationship status: Not on file   Other Topics Concern    Not on file   Social History Narrative    Not on file     Review of Systems   Constitutional: Positive for activity change, appetite change and fatigue. Negative for chills, diaphoresis and fever.   Respiratory: Negative for cough and shortness of breath.    Gastrointestinal: Positive for abdominal distention and abdominal pain. Negative for diarrhea, nausea and vomiting.   Genitourinary: Negative for dysuria.   Skin: Positive for wound. Negative for color change, pallor and rash.   Neurological: Negative for headaches.   All other systems reviewed and are negative.    Objective:     Vital Signs (Most Recent):  Temp: 98.3 °F (36.8 °C) (08/13/20 1206)  Pulse: 79 (08/13/20 1415)  Resp: 18 (08/13/20 1415)  BP: 135/76 (08/13/20 1206)  SpO2: 97 % (08/13/20 1415) Vital Signs (24h Range):  Temp:  [97.8 °F (36.6 °C)-99 °F (37.2 °C)] 98.3 °F (36.8 °C)  Pulse:  [72-98] 79  Resp:  [14-20] 18  SpO2:  [94 %-99 %] 97 %  BP: (120-167)/(63-98) 135/76     Weight: 70.7 kg (155 lb 13.8 oz)  Body mass index is 29.45 kg/m².    Estimated Creatinine Clearance: 59.7 mL/min (based on SCr of 0.8 mg/dL).    Physical Exam  Vitals signs and nursing note reviewed.   Constitutional:       Appearance: She is not toxic-appearing or diaphoretic.   HENT:      Head: Normocephalic.   Cardiovascular:      Rate and Rhythm: Normal rate and regular rhythm.   Pulmonary:      Effort: Pulmonary effort is normal. No respiratory distress.      Breath sounds: Normal breath sounds. No stridor.   Abdominal:      General: Abdomen is flat. There is distension.      Palpations: Abdomen is soft. There is  no mass.      Tenderness: There is no abdominal tenderness.      Hernia: No hernia is present.      Comments: Wounds packed.   Drain in place  Colostomy in place   Skin:     General: Skin is warm and dry.      Coloration: Skin is not jaundiced or pale.   Neurological:      General: No focal deficit present.      Mental Status: She is alert and oriented to person, place, and time.   Psychiatric:         Mood and Affect: Mood normal.         Significant Labs: All pertinent labs within the past 24 hours have been reviewed.    Significant Imaging: I have reviewed all pertinent imaging results/findings within the past 24 hours.   No

## 2020-09-14 PROBLEM — Z13.6 SCREENING FOR CARDIOVASCULAR CONDITION: Status: ACTIVE | Noted: 2020-01-01

## 2020-09-14 PROBLEM — I50.9 CHF (CONGESTIVE HEART FAILURE): Status: ACTIVE | Noted: 2018-11-07

## 2020-09-14 PROBLEM — Z87.891 HISTORY OF TOBACCO ABUSE: Status: RESOLVED | Noted: 2018-11-07 | Resolved: 2020-01-01

## 2020-09-14 PROBLEM — I42.8 NON-ISCHEMIC CARDIOMYOPATHY: Status: ACTIVE | Noted: 2018-11-23

## 2020-09-14 PROBLEM — I10 HTN (HYPERTENSION): Status: ACTIVE | Noted: 2018-11-07

## 2020-10-13 NOTE — ED PROVIDER NOTE - PATIENT PORTAL LINK FT
You can access the FollowMyHealth Patient Portal offered by [facility] by registering at the following website: [email or web address]. By joining [facility]’s FollowMyHealth portal, you will also be able to view your health information using other applications (apps) compatible with our system.

## 2020-10-13 NOTE — ED PROVIDER NOTE - OBJECTIVE STATEMENT
Pt is a 80 F w/ a h/o COPD who presents with facial swelling and rash on only her R side of face. She says it started on Saturday and has gotten slightly worse since then. There are a few skin vesicles around her mouth that hurt but do not itch. The lesions further from her mouth are not painful. She has no issues eating, swallowing, or breathing. No sore throat, congestion, no oral lesions other than one "bump" on her upper R lip. Pt has not had symptoms before. Denies traveling, camping.

## 2020-10-13 NOTE — ED PROVIDER NOTE - PHYSICAL EXAMINATION
Kendal Merida MD  GENERAL: Patient awake alert NAD.  HEENT: NC/AT, Moist mucous membranes, PERRL, EOMI.  LUNGS: CTAB, no wheezes or crackles.   CARDIAC: RRR, no m/r/g.    ABDOMEN: Soft, NT, ND, No rebound, guarding.   EXT: No edema.   MSK:  no pain with movement, no deformities.  NEURO: A&Ox3. Moving all extremities.  SKIN: Warm and dry. +R face mild swelling around mouth, lips, and infraorbital. Small skin-colored vesicles around mouth only R side. A few small lesions inside mouth R side.  PSYCH: Normal affect.

## 2020-10-13 NOTE — ED ADULT NURSE NOTE - CHPI ED NUR SYMPTOMS NEG
no weakness/no chills/no fever/no nausea/no tingling/no vomiting/no dizziness/no pain/no decreased eating/drinking

## 2020-10-13 NOTE — ED ADULT NURSE NOTE - ED STAT RN HANDOFF DETAILS
Patient discharged by provider with instructions.  Patient leaving ED in wheelchair to be driven home by daughter.

## 2020-10-13 NOTE — ED PROVIDER NOTE - CLINICAL SUMMARY MEDICAL DECISION MAKING FREE TEXT BOX
[place]: pt with vesicles and swelling, etiology unknown, may be a reaction to the cream she applied to it vs herpes cold sores. Will prescribe acyclovir and f/u with derm

## 2020-10-13 NOTE — ED PROVIDER NOTE - ATTENDING CONTRIBUTION TO CARE
concern for contact dermatitis, shingles to right side of face. lesions are limited to one dermatome. no airway involvement. will give acyclovir. encourage cessation of anbesol. fu derm. return precautions

## 2020-10-13 NOTE — ED PROVIDER NOTE - NSFOLLOWUPCLINICS_GEN_ALL_ED_FT
[facility] - [place]  Dermatology  [address], [address]  [place] [address]  Phone: [phone]  Fax: [phone]  Follow Up Time:

## 2020-10-13 NOTE — ED PROVIDER NOTE - NS ED ROS FT
GENERAL: No fever, chills  EYES: no vision changes, no discharge.   ENT: no difficulty swallowing or speaking   CARDIAC: no chest pain/pressure, SOB, lower extremity swelling  PULMONARY: no cough, SOB  GI: no abdominal pain, n/v/d  : no dysuria  SKIN: + R face swelling, + R rash  NEURO: no headache, lightheadedness, paresthesia  MSK: No joint pain, myalgia, weakness.

## 2020-10-13 NOTE — ED ADULT NURSE NOTE - OBJECTIVE STATEMENT
Pt Pt arrives to  24 c/o facial swelling and rash on only her R side of face. Pt reports it presented on Saturday and has gotten slightly worse since then. MD team assessing pt.  Pt using a topical cream at home. No orders placed by Resident or Attending.

## 2020-10-13 NOTE — ED PROVIDER NOTE - NSFOLLOWUPINSTRUCTIONS_ED_ALL_ED_FT
You were seen in the ED for a face rash and swelling. Your physical exam was reassuring. You may have cold sores. Please 800mg acyclovir five times a day for 7 days.    Please avoid putting any other creams on your rash.    For pain, please take 650mg Tylenol every 6 hours as needed or 600mg ibuprofen every 8 hours as needed. Always take ibuprofen with food. You make take both Tylenol and ibuprofen as described above if one medication is not enough for your pain.    Please follow up with your PCP or a dermatologist in 2-3 days. Return to the ED if you experience any worsening or new symptoms or any symptoms that concern you, including fevers, chills, spreading rash, inability to eat/drink, difficulty breathing, throat swelling.

## 2020-10-13 NOTE — ED ADULT TRIAGE NOTE - CHIEF COMPLAINT QUOTE
pt c/o progressive R eye swelling and angioedema since yesterday, denies any new foods, airway patent with no compromise, resps even and unlabored b/l, NAD noted, unsure if she takes any statins pt c/o progressive R eye swelling and angioedema since yesterday, denies any new foods, airway patent with no compromise, resps even and unlabored b/l, NAD noted, unsure if she takes any ACE inhibitors

## 2020-10-13 NOTE — ED ADULT NURSE NOTE - CHIEF COMPLAINT QUOTE
pt c/o progressive R eye swelling and angioedema since yesterday, denies any new foods, airway patent with no compromise, resps even and unlabored b/l, NAD noted, unsure if she takes any ACE inhibitors

## 2020-11-24 PROBLEM — B02.29 HZV (HERPES ZOSTER VIRUS) POST HERPETIC NEURALGIA: Status: ACTIVE | Noted: 2020-01-01

## 2020-11-24 PROBLEM — Z78.9 NEVER USES SUNSCREEN: Status: ACTIVE | Noted: 2020-01-01

## 2020-11-24 PROBLEM — Z77.22 HISTORY OF SECOND HAND SMOKE EXPOSURE: Status: ACTIVE | Noted: 2020-01-01

## 2020-11-24 PROBLEM — Z87.2 HISTORY OF ECZEMA: Status: RESOLVED | Noted: 2020-01-01 | Resolved: 2020-01-01

## 2020-12-07 NOTE — H&P ADULT - ATTENDING COMMENTS
Agree with above. Pt with hx of COPD/CHF, previously ambulating without assistance, had a fall, sustaining a right FN Fx. I have discussed this condition and it's management in detail with the patient and her daughter (Marcelle: [phone]).     I have consented the patient for right hip hemiarthroplasty. We discussed risks, benefits and alternatives. Rationale of care was reviewed and all questions were answered. Surgical risks include but are not limited to infection, bleeding, nerve damage, chronic pain, LLD, VTE, dislocation, fracture, loss of life/limb, and need for further surgical procedures.  The patient understood all of this and would like to proceed.

## 2020-12-07 NOTE — ED ADULT NURSE NOTE - CHIEF COMPLAINT QUOTE
Pt comes to ED s/p a witnessed "slip and fall" at home. EMS reports pt did not hit her head, denies LOC and denies anti-coagulant use. Pt endorsing right hip & right leg pain at this time. Right leg splint placed by EMS

## 2020-12-07 NOTE — ED PROVIDER NOTE - INTERNATIONAL TRAVEL
As we discussed, take 15 mg lisinopril daily instead of 10, and be sure to monitor blood pressure closely.  Keep a record of blood sugar your doctor.  If your blood pressure is over 150 systolic, or over 100 diastolic, take prescribed hydralazine.  Be sure to talk to your doctor about your ER visit tomorrow, and return here as needed or if worse in any way.  
No

## 2020-12-07 NOTE — H&P ADULT - HISTORY OF PRESENT ILLNESS
80yFemale c/o R hip pain s/p mechanical fall in bathroom Patient denies head hit or LOC. Patient denies numbness or tingling in the LLE. Patient denies any other injuries. Patient does not use any assistive devices, lives with daughter     ROS: 10 point review of systems otherwise negative unless noted in HPI    PMH:  CHF  COPD    PSH:  No significant past surgical history      AH:    Meds: See med rec    T(C): 36.4 (12-07-20 @ 17:55)  HR: 92 (12-07-20 @ 17:55)  BP: 133/61 (12-07-20 @ 17:55)  RR: 19 (12-07-20 @ 17:55)  SpO2: 94% (12-07-20 @ 17:55)  Wt(kg): --                        11.5   14.83 )-----------( 227      ( 07 Dec 2020 18:50 )             35.3     12-07    137  |  97<L>  |  34<H>  ----------------------------<  117<H>  TNP   |  33<H>  |  1.99<H>    Ca    9.4      07 Dec 2020 18:53    TPro  7.5  /  Alb  3.6  /  TBili  0.4  /  DBili  x   /  AST  36<H>  /  ALT  20  /  AlkPhos  52  12-07    PT/INR - ( 07 Dec 2020 18:50 )   PT: 11.6 sec;   INR: 1.02 ratio         PTT - ( 07 Dec 2020 18:50 )  PTT:22.5 sec      PE  Gen: NAD, alert and oriented  Resp: Unlabored breathing  RLE: Skin intact, no ecchymosis, 2+ pitting edema        SILT DP/SP/ Diana/Saph/T,        +EHL/FHL/TA/Gastroc,        Knee/ankle painless ROM,        hip ROM limited 2/2 pain,       DP+,        soft compartments, no calf ttp,        +log roll.      Secondary:  No TTP over bony landmarks, SILT BL, ROM intact BL, distal pulses palpable.    Imaging:  XR demonstrating R FN fx                          11.5   14.83 )-----------( 227      ( 07 Dec 2020 18:50 )             35.3       A/P: 80F with a R displaced FN fx    OR tomorrow for hemiarthroplasty  Neuro: Pain control  Resp: IS  GI: Regular diet, bowel reg/ NPO @ midnight/IVF  MSK: SIOBHAN WARD  Heme: DVT PPX: SQH x1, venodynes  FU COVID  FU femur XRs  FU medical clearance (aware)  Consented   80yFemale c/o R hip pain s/p mechanical fall in bathroom Patient denies head hit or LOC. Patient denies numbness or tingling in the RLE. Patient denies any other injuries. Patient does not use any assistive devices, lives with daughter     ROS: 10 point review of systems otherwise negative unless noted in HPI    PMH:  CHF  COPD  HTN    PSH:  No significant past surgical history      AH:    Meds: See med rec    T(C): 36.4 (12-07-20 @ 17:55)  HR: 92 (12-07-20 @ 17:55)  BP: 133/61 (12-07-20 @ 17:55)  RR: 19 (12-07-20 @ 17:55)  SpO2: 94% (12-07-20 @ 17:55)  Wt(kg): --                        11.5   14.83 )-----------( 227      ( 07 Dec 2020 18:50 )             35.3     12-07    137  |  97<L>  |  34<H>  ----------------------------<  117<H>  TNP   |  33<H>  |  1.99<H>    Ca    9.4      07 Dec 2020 18:53    TPro  7.5  /  Alb  3.6  /  TBili  0.4  /  DBili  x   /  AST  36<H>  /  ALT  20  /  AlkPhos  52  12-07    PT/INR - ( 07 Dec 2020 18:50 )   PT: 11.6 sec;   INR: 1.02 ratio         PTT - ( 07 Dec 2020 18:50 )  PTT:22.5 sec      PE  Gen: NAD, alert and oriented  Resp: Unlabored breathing  RLE: Skin intact, no ecchymosis, 2+ pitting edema        SILT DP/SP/ Diana/Saph/T,        +EHL/FHL/TA/Gastroc,        Knee/ankle painless ROM,        hip ROM limited 2/2 pain,       DP+,        soft compartments, no calf ttp,        +log roll.      Secondary:  No TTP over bony landmarks, SILT BL, ROM intact BL, distal pulses palpable.    Imaging:  XR demonstrating R FN fx                          11.5   14.83 )-----------( 227      ( 07 Dec 2020 18:50 )             35.3       A/P: 80F with a R displaced FN fx    OR tomorrow for hemiarthroplasty  Neuro: Pain control  Resp: IS  GI: Regular diet, bowel reg/ NPO @ midnight/IVF  MSK: SIOBHAN WARD  Heme: DVT PPX: SQH x1, venodynes  FU COVID  FU femur XRs  FU medical clearance (aware)  Consented

## 2020-12-07 NOTE — ED ADULT TRIAGE NOTE - CHIEF COMPLAINT QUOTE
Pt comes to ED s/p a witnessed "slip and fall" at home. EMS reports pt did not hit her head, denies LOC and denies anti-coagulant use. Pt endorsing right hip & right leg pain at this time. Pt comes to ED s/p a witnessed "slip and fall" at home. EMS reports pt did not hit her head, denies LOC and denies anti-coagulant use. Pt endorsing right hip & right leg pain at this time. Right leg splint placed by EMS

## 2020-12-07 NOTE — ED PROVIDER NOTE - PHYSICAL EXAMINATION
Well appearing, well nourished, awake, alert, oriented to person, place, time/situation and in no apparent distress.    Airway patent    Eyes without scleral injection. No jaundice.    Strong pulse.    Respirations unlabored.    Abdomen soft, non-tender, no guarding.    MSK: R hip TTP, R leg short and externally-rotated. Able to move toes. Feet equal temperature.    Alert and oriented, no gross motor or sensory deficits.    Skin normal color for race, warm, dry and intact. No evidence of rash.    No SI/HI.

## 2020-12-07 NOTE — ED ADULT NURSE NOTE - OBJECTIVE STATEMENT
Patient arrives to ED s/p fall earlier today. Patient complaining of right hip and leg pain. A&Ox4. Patient denies any headache, chest pain, SOB, or dizziness. Patient reports the floor at her house is very slippery and she did not feel lightheaded or dizzy before it happened. No LOC or hitting of head. Bilateral legs warm, pedal pulses palpated , and cap refill less than three seconds. Splint put in place by EMS to right leg. Patient arrives to ED s/p fall earlier today. Patient complaining of right hip and leg pain. A&Ox4. Patient denies any headache, chest pain, SOB, or dizziness. Patient reports the floor at her house is very slippery and she did not feel lightheaded or dizzy before it happened. No LOC or hitting of head. Bilateral legs warm, pedal pulses palpated , and cap refill less than three seconds. Splint put in place by EMS to right leg. Suspicion for right leg shortening and rotation out. No bruising noted. Patient arrives to ED s/p fall earlier today. Patient complaining of right hip and leg pain. A&Ox4. Patient denies any headache, chest pain, SOB, or dizziness. Patient reports the floor at her house is very slippery and she did not feel lightheaded or dizzy before it happened. No LOC or hitting of head. Bilateral legs warm, pedal pulses palpated , and cap refill less than three seconds. Splint put in place by EMS to right leg. Suspicion for right leg shortening and rotation out. No bruising noted. 22g IV placed to left arm. Labs sent. Patient medicated as ordered.

## 2020-12-07 NOTE — ED PROVIDER NOTE - OBJECTIVE STATEMENT
Brionna: Slipped and fell today, landing on R hip. No other injury/LOC/HA/CP/SOB. C/o R hip pain. Is on anticoagulation.

## 2020-12-07 NOTE — ED PROVIDER NOTE - ATTENDING CONTRIBUTION TO CARE
I performed a face-to-face evaluation of the patient and performed a history and physical examination. I agree with the history and physical examination.    Slip and fall. Broken R hip. Labs, xray, CT, pain meds, Ortho consult, admit.

## 2020-12-07 NOTE — ED ADULT NURSE NOTE - NSIMPLEMENTINTERV_GEN_ALL_ED
Implemented All Fall Risk Interventions:  [place] to call system. Call bell, personal items and telephone within reach. Instruct patient to call for assistance. Room bathroom lighting operational. Non-slip footwear when patient is off stretcher. Physically safe environment: no spills, clutter or unnecessary equipment. Stretcher in lowest position, wheels locked, appropriate side rails in place. Provide visual cue, wrist band, yellow gown, etc. Monitor gait and stability. Monitor for mental status changes and reorient to person, place, and time. Review medications for side effects contributing to fall risk. Reinforce activity limits and safety measures with patient and family.

## 2020-12-08 NOTE — CONSULT NOTE ADULT - PROBLEM SELECTOR RECOMMENDATION 4
likely reactive, would trend. No fever or other infectious symptoms reported
likely reactive, would trend. No fever or other infectious symptoms reported

## 2020-12-08 NOTE — CONSULT NOTE ADULT - PROBLEM SELECTOR RECOMMENDATION 2
Cr of 1.99 on admission with no potassium available. Pt does not appear overtly overloaded on exam, rather probably hypovolemic. Per pt's daughter she is now on ?80 mg lasix BID at home but denies any HF symptoms.   -Agree with trial of light NS overnight. Would hold lasix for now until TODD elucidated. will need to touch base with Pt's PCP/cards in AM regarding why pt on such high doses of lasix  -hold lisinopril given TODD. repeat BMP to check potassium Cr of 1.99 on admission with no potassium available. Pt does not appear overtly overloaded on exam, rather probably hypovolemic. Per pt's daughter she is now on ?80 mg lasix BID at home but denies any HF symptoms.   -Agree with trial of light NS overnight. Would hold lasix for now until TODD elucidated. will need to touch base with Pt's PCP/cards in AM regarding why pt on such high doses of lasix  -hold lisinopril given TODD. repeat BMP to check potassium  -if renal function still worsening would get urine electrolytes, urine BUN, urine creatinine and renal ultrasound

## 2020-12-08 NOTE — CONSULT NOTE ADULT - SUBJECTIVE AND OBJECTIVE BOX
Patient is a 80y old  Female who presents with a chief complaint of Right femoral neck fracture (08 Dec 2020 13:07)      HPI:  80yFemale c/o R hip pain s/p mechanical fall   XR demonstrating R Femoral neck fracture. Planned for OR for hemiarthroplasty.   Patient states she ambulates without device and is independent for ADLs at baseline. She lives with her daughter in a house with stairs.    Reports mild pain.               REVIEW OF SYSTEMS  Constitutional - No fever, No weight loss, No fatigue  HEENT - No eye pain, No visual disturbances, No difficulty hearing, No tinnitus, No vertigo, No neck pain  Respiratory - No cough, No wheezing, No shortness of breath  Cardiovascular - No chest pain, No palpitations  Gastrointestinal - No abdominal pain, No nausea, No vomiting, No diarrhea, No constipation  Genitourinary - No dysuria, No frequency, No hematuria, No incontinence  Neurological - No headaches, No memory loss, + loss of strength, No numbness, No tremors  Skin - No itching, No rashes, No lesions   Endocrine - No temperature intolerance  Musculoskeletal - + r hip pain  Psychiatric - No depression, No anxiety    PAST MEDICAL & SURGICAL HISTORY  History of COPD    Chronic CHF    Tobacco abuse    No pertinent past medical history    No significant past surgical history        SOCIAL HISTORY  Smoking -  former smoker  EtOH - Denied   Drugs - Denied    FUNCTIONAL HISTORY  Lives with her daughter in private house with stairs  Independent at baseline without device    CURRENT FUNCTIONAL STATUS  pending. patient is pre op    FAMILY HISTORY   No pertinent family history in first degree relatives        RECENT LABS/IMAGING  CBC Full  -  ( 08 Dec 2020 04:25 )  WBC Count : 15.91 K/uL  RBC Count : 4.26 M/uL  Hemoglobin : 12.1 g/dL  Hematocrit : 39.2 %  Platelet Count - Automated : 242 K/uL  Mean Cell Volume : 92.0 fL  Mean Cell Hemoglobin : 28.4 pg  Mean Cell Hemoglobin Concentration : 30.9 gm/dL  Auto Neutrophil # : x  Auto Lymphocyte # : x  Auto Monocyte # : x  Auto Eosinophil # : x  Auto Basophil # : x  Auto Neutrophil % : x  Auto Lymphocyte % : x  Auto Monocyte % : x  Auto Eosinophil % : x  Auto Basophil % : x    12-08    141  |  99  |  35<H>  ----------------------------<  132<H>  5.0   |  29  |  1.85<H>    Ca    9.7      08 Dec 2020 04:25    TPro  7.5  /  Alb  3.6  /  TBili  0.4  /  DBili  x   /  AST  36<H>  /  ALT  20  /  AlkPhos  52  12-07        VITALS  T(C): 36.6 (12-08-20 @ 13:18), Max: 37.2 (12-08-20 @ 09:54)  HR: 90 (12-08-20 @ 13:18) (77 - 102)  BP: 102/87 (12-08-20 @ 13:18) (92/72 - 159/72)  RR: 20 (12-08-20 @ 13:18) (18 - 24)  SpO2: 100% (12-08-20 @ 13:18) (88% - 100%)  Wt(kg): --    ALLERGIES  No Known Allergies      MEDICATIONS   acetaminophen   Tablet .. 975 milliGRAM(s) Oral every 8 hours  budesonide 160 MICROgram(s)/formoterol 4.5 MICROgram(s) Inhaler 2 Puff(s) Inhalation two times a day  carvedilol 6.25 milliGRAM(s) Oral every 12 hours  gabapentin 300 milliGRAM(s) Oral two times a day  hydrALAZINE 25 milliGRAM(s) Oral every 8 hours  isosorbide   mononitrate ER Tablet (IMDUR) 30 milliGRAM(s) Oral daily  magnesium hydroxide Suspension 30 milliLiter(s) Oral daily PRN  melatonin 3 milliGRAM(s) Oral at bedtime  nicotine -  14 mG/24Hr(s) Patch 1 patch Transdermal daily  oxyCODONE    IR 2.5 milliGRAM(s) Oral every 4 hours PRN  oxyCODONE    IR 5 milliGRAM(s) Oral every 4 hours PRN  senna 2 Tablet(s) Oral at bedtime  sodium chloride 0.9%. 1000 milliLiter(s) IV Continuous <Continuous>  tiotropium 18 MICROgram(s) Capsule 1 Capsule(s) Inhalation daily      ----------------------------------------------------------------------------------------  PHYSICAL EXAM  Constitutional - NAD, Comfortable  HEENT - NCAT, EOMI, wearing nasal cannula   Chest -no respiratory distress  Cardiovascular - no edema   Extremities - RLE shortened and externally rotated  Neurologic Exam -                    Cognitive - Awake, Alert, AAO to self, place, date, year, situation     Communication - Fluent, No dysarthria     Cranial Nerves - CN 2-12 intact     Motor -                      LEFT    UE - ShAB 5/5, EF 5/5, EE 5/5, WE 5/5,  5/5                    RIGHT UE - ShAB 5/5, EF 5/5, EE 5/5, WE 5/5,  5/5                    LEFT    LE - 3/5, limited by R hip pain with movement                    RIGHT LE - not assessed     Sensory - Intact to LT      Balance - WNL Static  Psychiatric - Mood stable, Affect WNL  ----------------------------------------------------------------------------------------  ASSESSMENT/PLAN  79 yo f admitted s/p fall with R hip fx.  PreOp    Pain -tylenol, oxy IR, with bowel regimen  DVT PPX - scd  NPO for surgery  Rehab - pending medical course and progress with PT, likely inpatient rehab.  anticipate patient will be able to participate in acute rehab.   Recommend ACUTE inpatient rehabilitation for the functional deficits consisting of 3 hours of therapy/day & 24 hour RN/daily PMR physician for comorbid medical management. Will continue to follow for ongoing rehab needs and recommendations.

## 2020-12-08 NOTE — CONSULT NOTE ADULT - SUBJECTIVE AND OBJECTIVE BOX
Cardiology/Vascular Medicine Inpatient Consultation Note    HISTORY OF PRESENT ILLNESS:  Patient known to me from the office.    She is an 81 yo woman with AAA s/p recent EVAR, HTN, chronic tobacco use, now presents with acute right hip pain s/p mechanical fall in bathroom.       Allergies  No Known Allergies    MEDICATIONS:  carvedilol 6.25 milliGRAM(s) Oral every 12 hours  hydrALAZINE 25 milliGRAM(s) Oral every 8 hours  isosorbide   mononitrate ER Tablet (IMDUR) 30 milliGRAM(s) Oral daily  budesonide 160 MICROgram(s)/formoterol 4.5 MICROgram(s) Inhaler 2 Puff(s) Inhalation two times a day  tiotropium 18 MICROgram(s) Capsule 1 Capsule(s) Inhalation daily  acetaminophen   Tablet .. 975 milliGRAM(s) Oral every 8 hours  gabapentin 300 milliGRAM(s) Oral two times a day  melatonin 3 milliGRAM(s) Oral at bedtime  oxyCODONE    IR 2.5 milliGRAM(s) Oral every 4 hours PRN  oxyCODONE    IR 5 milliGRAM(s) Oral every 4 hours PRN  magnesium hydroxide Suspension 30 milliLiter(s) Oral daily PRN  senna 2 Tablet(s) Oral at bedtime  sodium chloride 0.9%. 1000 milliLiter(s) IV Continuous <Continuous>    PAST MEDICAL & SURGICAL HISTORY:  History of COPD  Chronic CHF  Tobacco abuse  No significant past surgical history    FAMILY HISTORY:  No pertinent family history in first degree relative    SOCIAL HISTORY:    As above, as per chart notes    REVIEW OF SYSTEMS:  As above, as part chart notes    PHYSICAL EXAM:  T(C): 36.8 (12-08-20 @ 17:30), Max: 37.2 (12-08-20 @ 09:54)  HR: 94 (12-08-20 @ 17:30) (77 - 102)  BP: 107/64 (12-08-20 @ 17:30) (102/87 - 159/72)  RR: 20 (12-08-20 @ 17:30) (18 - 24)  SpO2: 100% (12-08-20 @ 17:30) (88% - 100%)    Appearance: Elderly woman, chronically-ill appearing, emaciated/thin	  HEENT:   No JVD  Cardiovascular: Normal S1 S2, No JVD, No murmurs, No edema  Respiratory: Decreased breath sounds bilaterally  Psychiatry: Drowsy  Gastrointestinal:  Soft, Non-tender, + BS	  Neurologic: Non-focal  Extremities: No edema      LABS:	 	    CBC Full  -  ( 08 Dec 2020 04:25 )  WBC Count : 15.91 K/uL  Hemoglobin : 12.1 g/dL  Hematocrit : 39.2 %  Platelet Count - Automated : 242 K/uL  Mean Cell Volume : 92.0 fL  Mean Cell Hemoglobin : 28.4 pg  Mean Cell Hemoglobin Concentration : 30.9 gm/dL  Auto Neutrophil # : x  Auto Lymphocyte # : x  Auto Monocyte # : x  Auto Eosinophil # : x  Auto Basophil # : x  Auto Neutrophil % : x  Auto Lymphocyte % : x  Auto Monocyte % : x  Auto Eosinophil % : x  Auto Basophil % : x    12-08    141  |  99  |  35<H>  ----------------------------<  132<H>  5.0   |  29  |  1.85<H>  12-07    x   |  x   |  x   ----------------------------<  x   TNP   |  x   |  x     Ca    9.7      08 Dec 2020 04:25  Ca    9.4      07 Dec 2020 18:53    TPro  7.5  /  Alb  3.6  /  TBili  0.4  /  DBili  x   /  AST  36<H>  /  ALT  20  /  AlkPhos  52  12-07      proBNP:   Lipid Profile:   HgA1c:   TSH:       CARDIAC MARKERS:            TELEMETRY: 	    ECG:   	  RADIOLOGY:  OTHER: 	      PREVIOUS DIAGNOSTIC CARDIOVASCULAR TESTING:      [ ]  Echocardiogram:  [ ]  Catheterization:  [ ]  Stress test:    [ ]  Vascular studies:  	  	     Cardiology/Vascular Medicine Inpatient Consultation Note    HISTORY OF PRESENT ILLNESS:  Patient known to me from the office.    She is an 81 yo woman with AAA s/p recent EVAR, HTN, chronic tobacco use, now presents with acute right hip pain s/p mechanical fall in bathroom.   Imaging noted an acute, impacted right femoral neck fracture.  She is now scheduled for right THR.    No cardiac complaints.  No active arrhythmias.  No bleeding or clotting disorders.    Last cardiac assessment was in 9/2020 -- echocardiogram demonstrated normal biventricular systolic function, mild-moderate MR, moderate pulmonary HTN PASP 54 mmHg.    Patient is clinically and hemodynamically stable.  Cardiac evaluation was unremarkable.  Patient appears euvolemic on exam.    From the cardiac perspective, the patient may proceed with hip surgery without the need for additional cardiac testing or risk stratification.    Allergies  No Known Allergies    MEDICATIONS:  carvedilol 6.25 milliGRAM(s) Oral every 12 hours  hydrALAZINE 25 milliGRAM(s) Oral every 8 hours  isosorbide   mononitrate ER Tablet (IMDUR) 30 milliGRAM(s) Oral daily  budesonide 160 MICROgram(s)/formoterol 4.5 MICROgram(s) Inhaler 2 Puff(s) Inhalation two times a day  tiotropium 18 MICROgram(s) Capsule 1 Capsule(s) Inhalation daily  acetaminophen   Tablet .. 975 milliGRAM(s) Oral every 8 hours  gabapentin 300 milliGRAM(s) Oral two times a day  melatonin 3 milliGRAM(s) Oral at bedtime  oxyCODONE    IR 2.5 milliGRAM(s) Oral every 4 hours PRN  oxyCODONE    IR 5 milliGRAM(s) Oral every 4 hours PRN  magnesium hydroxide Suspension 30 milliLiter(s) Oral daily PRN  senna 2 Tablet(s) Oral at bedtime  sodium chloride 0.9%. 1000 milliLiter(s) IV Continuous <Continuous>    PAST MEDICAL & SURGICAL HISTORY:  History of COPD  Chronic CHF  Tobacco abuse  No significant past surgical history    FAMILY HISTORY:  No pertinent family history in first degree relative    SOCIAL HISTORY:    As above, as per chart notes    REVIEW OF SYSTEMS:  As above, as part chart notes    PHYSICAL EXAM:  T(C): 36.8 (12-08-20 @ 17:30), Max: 37.2 (12-08-20 @ 09:54)  HR: 94 (12-08-20 @ 17:30) (77 - 102)  BP: 107/64 (12-08-20 @ 17:30) (102/87 - 159/72)  RR: 20 (12-08-20 @ 17:30) (18 - 24)  SpO2: 100% (12-08-20 @ 17:30) (88% - 100%)    Appearance: Elderly woman, chronically-ill appearing, emaciated/thin	  HEENT:   No JVD  Cardiovascular: Normal S1 S2, No JVD, No murmurs, No edema  Respiratory: Decreased breath sounds bilaterally  Psychiatry: Drowsy  Gastrointestinal:  Soft, Non-tender, + BS	  Neurologic: Non-focal  Extremities: No edema      LABS:	 	                          12.1   15.91 )-----------( 242      ( 08 Dec 2020 04:25 )             39.2     12-08    141  |  99  |  35<H>  ----------------------------<  132<H>  5.0   |  29  |  1.85<H>  12-07    x   |  x   |  x   ----------------------------<  x   TNP   |  x   |  x     Ca    9.7      08 Dec 2020 04:25  Ca    9.4      07 Dec 2020 18:53    TPro  7.5  /  Alb  3.6  /  TBili  0.4  /  DBili  x   /  AST  36<H>  /  ALT  20  /  AlkPhos  52  12-07    ra< from: CT Hip No Cont, Right (12.07.20 @ 21:00) >    EXAM:  CT HIP ONLY RT      EXAM:  CT 3D RECONSTRUCT [facility]        PROCEDURE DATE:  Dec  7 2020         INTERPRETATION:  CLINICAL INDICATION: Right hip pain status post fall, short, external rotated, assess fracture.    TECHNIQUE: CT axial images of the right hip were obtained without intravenous contrast. Coronal and sagittal reformatted images were also obtained. 3-D volume rendering images were obtained from a separate workstation.    COMPARISON: XR: 12/7/2020. CT: 11/1/2018. MR: None.    FINDINGS:    Bones: Osteopenia. Acute, impacted right femoral neck fracture with superior and anteromedial displacement. No dislocation.    Right hip osteoarthrosis. Degenerative changes of the visualized lower lumbar spine, right sacroiliac joint and pubic symphysis. Stable grade 1 anterolisthesis of L4 on L5 and minimal retrolisthesis of L5 on S1.    Soft tissue: No obvious hematoma in the visualized soft tissue. Right hip lipohemarthrosis. Diffuse muscle bulk loss with fatty replacement. Right buttock injection granulomas.    Additional: Fecalized distal small bowel loop without a discrete position, which may be due to stasis or incompetent ileocecal valve. Calcified uterine fibroid. Nonspecific fluid in the vaginal canal. Trace pelvic free fluid.    IMPRESSION:    Acute, impacted right femoral neck fracture as described.      DORIS WALSH MD; Attending Radiologist  This document has been electronically signed. Dec  8 2020  2:16AM    < end of copied text >  < from: Transthoracic Echocardiogram (09.10.20 @ 09:55) >  Patient name: SAURABH EDWARDS  YOB: 1940   Age: 79 (F)   MR#: [id number]  Study Date: 9/10/2020  Location: O/PSonographer: LOR Liu  Study quality: Technically Fair  Referring Physician: Jun Valencia MD / Hernesto Benitez MD  Blood Pressure: 129/50 mmHg  Height: 165 cm  Weight: 49 kg  BSA: 1.5 m2  ------------------------------------------------------------------------  PROCEDURE: Transthoracic echocardiogram with 2-D, M-Mode  and complete spectral and color flow Doppler.  INDICATION: Essential (primary) hypertension (I10), Heart  failure, unspecified (I50.9), Other cardiomyopathies  (I42.8)  ------------------------------------------------------------------------  DIMENSIONS:  Dimensions:     Normal Values:  LA:3.1 cm    2.0 - 4.0 cm  Ao:     2.6 cm    2.0 - 3.8 cm  SEPTUM: 1.2 cm    0.6 - 1.2 cm  PWT:    1.2 cm    0.6 - 1.1 cm  LVIDd:  4.0 cm    3.0 - 5.6 cm  LVIDs:  2.6 cm    1.8 - 4.0 cm  Derived Variables:  LVMI: 108 g/m2  RWT: 0.60  Fractional short: 35 %  Ejection Fraction (Teicholtz): 65 %  ------------------------------------------------------------------------  OBSERVATIONS:  Mitral Valve: Mitral annular calcification, otherwise  normal mitral valve. Mild-moderate, posteriorly-directed  mitral regurgitation.  Aortic Root: Normal aortic root.  Aortic Valve: Calcified trileaflet aortic valve with normal  opening. Minimal aortic regurgitation.  Left Atrium: Normal left atrium.  LA volume index = 28  cc/m2.  Left Ventricle: Normal left ventricular systolic function.  No segmental wall motion abnormalities. Mild concentric  left ventricular hypertrophy. Mild diastolic dysfunction  (Stage I).  Right Heart: Normal right atrium. Normal right ventricular  size and function. Normal tricuspid valve. Minimal  tricuspid regurgitation. Pulmonic valve not well  visualized. Minimal pulmonic regurgitation.  Pericardium/PleuraNormal pericardium with no pericardial  effusion.  Hemodynamic: Estimated right ventricular systolic pressure  equals 54 mm Hg, assuming right atrial pressure equals 10  mm Hg, consistent with moderate pulmonary hypertension.  ------------------------------------------------------------------------  CONCLUSIONS:  1. Mitral annular calcification, otherwise normal mitral  valve. Mild-moderate, posteriorly-directed  mitral  regurgitation.  2. Calcified trileaflet aortic valve with normal opening.  Minimal aortic regurgitation.  3. Mild concentric left ventricular hypertrophy.  4. Normal left ventricular systolic function. No segmental  wall motion abnormalities.  5. Mild diastolic dysfunction (Stage I).  6. Normal right ventricular size and function.  7. Normal tricuspid valve. Minimal tricuspid regurgitation.  8. Estimated pulmonary artery systolic pressure equals 54  mm Hg, assuming right atrial pressure equals 10  mm Hg,  consistent with moderate pulmonary hypertension.  ------------------------------------------------------------------------  Confirmed on  9/10/2020 - 11:44:40 by Jun Valencia MD, [facility] with [facility],  ALFONSO, SHER  ------------------------------------------------------------------------    < end of copied text >

## 2020-12-08 NOTE — CONSULT NOTE ADULT - PROBLEM SELECTOR RECOMMENDATION 2
Cr of 1.99 on admission with no potassium available. Pt does not appear overtly overloaded on exam, rather probably hypovolemic. Per pt's daughter she is now on ?80 mg lasix BID at home but denies any HF symptoms.   -Agree with trial of light NS overnight. Would hold lasix for now until TODD elucidated. will need to touch base with Pt's PCP/cards in AM regarding why pt on such high doses of lasix  -hold lisinopril given TODD. repeat BMP to check potassium  -if renal function still worsening would get urine electrolytes, urine BUN, urine creatinine and renal ultrasound

## 2020-12-08 NOTE — CONSULT NOTE ADULT - ASSESSMENT
80y Female with hx of COPD (no home o2), ?HTN, ?systolic HF presents to the ED c/o R hip pain s/p mechanical fall in bathroom today. Found in ED to have an TODD and acute R subcapital femoral fracture. To be admitted to orthopaedic service for surgical intervention.

## 2020-12-08 NOTE — PROGRESS NOTE ADULT - SUBJECTIVE AND OBJECTIVE BOX
Nathaniel Raya MD  [employer]  Pager [phone]  Email: [email or web address]          PROGRESS NOTE:     Patient is a 80y old  Female who presents with a chief complaint of R hip fracture (08 Dec 2020 00:10)      SUBJECTIVE / OVERNIGHT EVENTS:  Patient seen and examined this morning. She states that her pain is generally well controlled. She denies shortness of breath, f/c/n/v. Patient asked questions regarding her medical issues and providers, she states that she would prefer that I speak to her daughter. Attempted to fran the patient's daughter, but was unsuccessful at reaching her. Based on outpatient records, patient follows up with Dr. Valencia, cardiology. Case discussed with Dr. Valencia.        MEDICATIONS  (STANDING):  acetaminophen   Tablet .. 975 milliGRAM(s) Oral every 8 hours  budesonide 160 MICROgram(s)/formoterol 4.5 MICROgram(s) Inhaler 2 Puff(s) Inhalation two times a day  carvedilol 6.25 milliGRAM(s) Oral every 12 hours  gabapentin 300 milliGRAM(s) Oral two times a day  hydrALAZINE 25 milliGRAM(s) Oral every 8 hours  isosorbide   mononitrate ER Tablet (IMDUR) 30 milliGRAM(s) Oral daily  melatonin 3 milliGRAM(s) Oral at bedtime  nicotine -  14 mG/24Hr(s) Patch 1 patch Transdermal daily  senna 2 Tablet(s) Oral at bedtime  sodium chloride 0.9%. 1000 milliLiter(s) (100 mL/Hr) IV Continuous <Continuous>  tiotropium 18 MICROgram(s) Capsule 1 Capsule(s) Inhalation daily    MEDICATIONS  (PRN):  magnesium hydroxide Suspension 30 milliLiter(s) Oral daily PRN Constipation  oxyCODONE    IR 2.5 milliGRAM(s) Oral every 4 hours PRN Moderate Pain (4 - 6)  oxyCODONE    IR 5 milliGRAM(s) Oral every 4 hours PRN Severe Pain (7 - 10)      CAPILLARY BLOOD GLUCOSE        I&O's Summary      PHYSICAL EXAM:  Vital Signs Last 24 Hrs  T(C): 37.2 (08 Dec 2020 09:54), Max: 37.2 (08 Dec 2020 09:54)  T(F): 98.9 (08 Dec 2020 09:54), Max: 98.9 (08 Dec 2020 09:54)  HR: 95 (08 Dec 2020 09:54) (77 - 102)  BP: 159/72 (08 Dec 2020 09:54) (92/72 - 159/72)  BP(mean): --  RR: 24 (08 Dec 2020 09:54) (18 - 24)  SpO2: 97% (08 Dec 2020 09:54) (88% - 100%)    GENERAL: NAD laying in bed  HEAD:  Atraumatic, Normocephalic  EYES: EOMI, PERRLA, conjunctiva and sclera clear  NECK: Supple, No JVD  CHEST/LUNG: Clear to auscultation anteriorly bilaterally; No wheeze  HEART: Regular rate and rhythm; No murmurs, rubs, or gallops  ABDOMEN: Soft, Nontender, Nondistended; Bowel sounds present  EXTREMITIES:  2+ Peripheral Pulses, No clubbing, cyanosis, or edema  PSYCH: AAOx3  NEUROLOGY: non-focal moving all extremities. RLE movement limited due to R hip pain  SKIN: No rashes or lesions    LABS:                        12.1   15.91 )-----------( 242      ( 08 Dec 2020 04:25 )             39.2     12-08    141  |  99  |  35<H>  ----------------------------<  132<H>  5.0   |  29  |  1.85<H>    Ca    9.7      08 Dec 2020 04:25    TPro  7.5  /  Alb  3.6  /  TBili  0.4  /  DBili  x   /  AST  36<H>  /  ALT  20  /  AlkPhos  52  12-07    PT/INR - ( 08 Dec 2020 10:02 )   PT: 12.6 sec;   INR: 1.10 ratio         PTT - ( 07 Dec 2020 18:50 )  PTT:22.5 sec            RADIOLOGY & ADDITIONAL TESTS:  Results Reviewed:   Imaging Personally Reviewed:  Electrocardiogram Personally Reviewed:    COORDINATION OF CARE:  Care Discussed with Consultants/Other Providers [Y/N]:  Prior or Outpatient Records Reviewed [Y/N]:

## 2020-12-08 NOTE — PROGRESS NOTE ADULT - SUBJECTIVE AND OBJECTIVE BOX
Ortho       Patient is seen and examined at bedside.  No significant overnight events. Having some pain in right hip especially with movement in bed.       Vital Signs Last 24 Hrs  T(C): 36.8 (08 Dec 2020 06:05), Max: 36.9 (08 Dec 2020 02:54)  T(F): 98.2 (08 Dec 2020 06:05), Max: 98.5 (08 Dec 2020 02:54)  HR: 102 (08 Dec 2020 06:05) (77 - 102)  BP: 143/100 (08 Dec 2020 06:05) (92/72 - 143/100)  BP(mean): --  RR: 24 (08 Dec 2020 06:05) (18 - 24)  SpO2: 97% (08 Dec 2020 06:05) (88% - 100%)    Gen: NAD    RLE: leg slightly short and externally rotated  Motor grossly intact distal + EHL/FHL/ TA/ GS. Sensation is grossly intact to light touch distal. Compartments are soft. Extremity  warm.  DP 1+    Labs:                        12.1   15.91 )-----------( 242      ( 08 Dec 2020 04:25 )             39.2     12-08    141  |  99  |  35<H>  ----------------------------<  132<H>  5.0   |  29  |  1.85<H>    Ca    9.7      08 Dec 2020 04:25    TPro  7.5  /  Alb  3.6  /  TBili  0.4  /  DBili  x   /  AST  36<H>  /  ALT  20  /  AlkPhos  52  12-07      A/P: Patient is a 80y y/o Female with right femoral neck fracture    - Pain control / Analgesia  - NPO  - DVT Prophylaxis on hold for OR  - bedrest  - OR today for tommy  - FU AM labs

## 2020-12-08 NOTE — CONSULT NOTE ADULT - SUBJECTIVE AND OBJECTIVE BOX
HPI:  80y Female with hx of COPD (no home o2), ?HTN, ?systolic HF presents to the ED c/o R hip pain s/p mechanical fall in bathroom today. Additional information obtained from pt's daughter who lives with her. She reports she wasn't wearing any shoes today and was walking to the bathroom when she slipped and fell on her right side. No LOC, head strike nor preceding symptoms like chest pain or dizziness. She noted immediate R hip pain limiting ambulation after she fell. No numbness or tingling in the RLE reported. At baseline pt can walk around with no issues and complete most ADLs including dressing herself and eating. She denies any fever, cough, chest pain, shortness of breath, abdominal pain, dysuria, diarrhea. Denies being on asa anymore. No other AC reported. Of note she did have an admission 2 years prior for new acute systolic CHF of unclear etiology as well as pna. She was sent home on lasix, coreg, aceI, hydral and imdur. Reportedly supposed to follow up outpatient for possible ischemic workup. Per daughter and pt she does not have any hx of CAD nor HF but still takes these medications. There is an echo from 3 months ago in the chart which shows normal L systolic function with no segmental wall motion abn. She does have mild diastolic dysufction, mild LVH, and moderate HTN on this echo. No hx of CVA. no known kidney disease and no DM. She has been eating well at home. In the ED pt was found to have an TODD and acute R subcapital femoral fracture      PAST MEDICAL & SURGICAL HISTORY:  History of COPD    Chronic CHF    Tobacco abuse    HTN    No significant past surgical history        Review of Systems:   CONSTITUTIONAL: No fever, weight loss, or fatigue  EYES: No eye pain, visual disturbances, or discharge  ENMT:  No difficulty hearing, tinnitus, vertigo; No sinus or throat pain  NECK: No pain or stiffness  RESPIRATORY: No cough, wheezing, chills or hemoptysis; No shortness of breath  CARDIOVASCULAR: No chest pain, palpitations, dizziness, or leg swelling  GASTROINTESTINAL: No abdominal or epigastric pain.  No diarrhea or constipation. No melena or hematochezia.  GENITOURINARY: No dysuria, frequency, hematuria, or incontinence  NEUROLOGICAL: +RLE weakness 2/2 pain  SKIN: No itching, burning, rashes, or lesions   MUSCULOSKELETAL: +R hip pain  PSYCHIATRIC: No depression      Allergies    No Known Allergies    Intolerances        Social History: 50 pack year smoker, still occasionally has a cigarette. Lives with daughter    FAMILY HISTORY:  No pertinent family history in first degree relatives        MEDICATIONS  (STANDING):  acetaminophen   Tablet .. 975 milliGRAM(s) Oral every 8 hours  budesonide 160 MICROgram(s)/formoterol 4.5 MICROgram(s) Inhaler 2 Puff(s) Inhalation two times a day  carvedilol 6.25 milliGRAM(s) Oral every 12 hours  furosemide    Tablet 40 milliGRAM(s) Oral two times a day  gabapentin 300 milliGRAM(s) Oral two times a day  hydrALAZINE 25 milliGRAM(s) Oral every 8 hours  isosorbide   mononitrate ER Tablet (IMDUR) 30 milliGRAM(s) Oral daily  melatonin 3 milliGRAM(s) Oral at bedtime  nicotine -  14 mG/24Hr(s) Patch 1 patch Transdermal daily  senna 2 Tablet(s) Oral at bedtime  sodium chloride 0.9%. 1000 milliLiter(s) (100 mL/Hr) IV Continuous <Continuous>  tiotropium 18 MICROgram(s) Capsule 1 Capsule(s) Inhalation daily    MEDICATIONS  (PRN):  magnesium hydroxide Suspension 30 milliLiter(s) Oral daily PRN Constipation  oxyCODONE    IR 2.5 milliGRAM(s) Oral every 4 hours PRN Moderate Pain (4 - 6)  oxyCODONE    IR 5 milliGRAM(s) Oral every 4 hours PRN Severe Pain (7 - 10)        CAPILLARY BLOOD GLUCOSE        I&O's Summary      PHYSICAL EXAM:  GENERAL: NAD laying in bed  HEAD:  Atraumatic, Normocephalic  EYES: EOMI, PERRLA, conjunctiva and sclera clear  NECK: Supple, No JVD  CHEST/LUNG: Clear to auscultation anteriorly bilaterally; No wheeze  HEART: Regular rate and rhythm; No murmurs, rubs, or gallops  ABDOMEN: Soft, Nontender, Nondistended; Bowel sounds present  EXTREMITIES:  2+ Peripheral Pulses, No clubbing, cyanosis, or edema  PSYCH: AAOx3  NEUROLOGY: non-focal moving all extremities. RLE movement limited due to R hip pain  SKIN: No rashes or lesions    LABS:                        11.5   14.83 )-----------( 227      ( 07 Dec 2020 18:50 )             35.3     12-07    x   |  x   |  x   ----------------------------<  x   TNP   |  x   |  x     Ca    9.4      07 Dec 2020 18:53    TPro  7.5  /  Alb  3.6  /  TBili  0.4  /  DBili  x   /  AST  36<H>  /  ALT  20  /  AlkPhos  52  12-07    PT/INR - ( 07 Dec 2020 18:50 )   PT: 11.6 sec;   INR: 1.02 ratio         PTT - ( 07 Dec 2020 18:50 )  PTT:22.5 sec    EKG: sinus tachycardia with PVCs    CXR: clear lungs, official pending    Pelvic xray: INTERPRETATION:  Acute subcapital right femoral fracture.    Follow up official report in AM.      Echo from 9/2020: CONCLUSIONS:  1. Mitral annular calcification, otherwise normal mitral  valve. Mild-moderate, posteriorly-directed  mitral  regurgitation.  2. Calcified trileaflet aortic valve with normal opening.  Minimal aortic regurgitation.  3. Mild concentric left ventricular hypertrophy.  4. Normal left ventricular systolic function. No segmental  wall motion abnormalities.  5. Mild diastolic dysfunction (Stage I).  6. Normal right ventricular size and function.  7. Normal tricuspid valve. Minimal tricuspid regurgitation.  8. Estimated pulmonary artery systolic pressure equals 54  mm Hg, assuming right atrial pressure equals 10  mm Hg,  consistent with moderate pulmonary hypertension.        RADIOLOGY & ADDITIONAL TESTS:    Imaging Personally Reviewed:    Consultant(s) Notes Reviewed:      Care Discussed with Consultants/Other Providers:

## 2020-12-08 NOTE — BRIEF OPERATIVE NOTE - COMMENTS
Patient became hypotensive to 40's/20's during procedure after final implants were placed. Code was called and ioban was placed around the surgical site. Chest compressions were performed. Patient regained sinus rhythm. Wound was sterilly prepped and closed. Patient coded twice more.

## 2020-12-08 NOTE — CONSULT NOTE ADULT - PROBLEM SELECTOR RECOMMENDATION 9
2/2 mechanical fall at home. orthopaedics planning for surgical intervention tomorrow PM  -Pt able to ambulate at home with no chest pain at baseline and can accomplish almost all ADLs.   -RCRI of 1-2 (hx of HF but unclear if ischemic) for orthopaedic surgery. Appears euvolemic at this time (possibly also hypovolemic).   -recent echo in chart from 3 months ago with normal EF but mild diastolic dysfunction and moderate pulm HTN  -No further cardiac workup needed prior to surgery at this time.

## 2020-12-08 NOTE — CONSULT NOTE ADULT - PROBLEM SELECTOR RECOMMENDATION 3
prior admission in 2018 for decompensated HF, unclear if ischemic. Suspect probably has component of CAD as pt is on imdur, coreg, etc. Pt and daughter deny any recent cath.   -as above, pt still on multiple aggressive diuresis at home despite recent echo showing improvement in EF. Will need to clarify home meds with PCP/ pt's cards in AM  -would rec holding lasix for now. Pt on room air and denies SOB or CP. No pitting edema seem on LE.
prior admission in 2018 for decompensated HF, unclear if ischemic. Suspect probably has component of CAD as pt is on imdur, coreg, etc. Pt and daughter deny any recent cath.   -as above, pt still on multiple aggressive diuresis at home despite recent echo showing improvement in EF. Will need to clarify home meds with PCP/ pt's cards in AM  -would rec holding lasix for now. Pt on room air and denies SOB or CP. No pitting edema seem on LE.

## 2020-12-08 NOTE — PROGRESS NOTE ADULT - PROBLEM SELECTOR PLAN 3
D/W with patient's cardiologist, Dr. Valencia  Follow recommendations regarding lasix use postop  Patient D/W with patient's cardiologist, Dr. Valencia  Follow recommendations regarding lasix use postop

## 2020-12-08 NOTE — CONSULT NOTE ADULT - ASSESSMENT
80y Female with hx of COPD (no home o2), ?HTN, ?systolic HF presents to the ED c/o R hip pain s/p mechanical fall in bathroom today. Found in ED to have an TODD and acute R subcapital femoral fracture. To be admitted to orthopaedic service for surgical intervention. Patient is an 79 yo woman with AAA s/p recent EVAR, HTN, chronic tobacco use, now presents with acute right hip pain s/p mechanical fall in bathroom.   Imaging noted an acute, impacted right femoral neck fracture.  She is now scheduled for right THR.    No cardiac complaints.  No active arrhythmias.  No bleeding or clotting disorders.    Last cardiac assessment was in 9/2020 -- echocardiogram demonstrated normal biventricular systolic function, mild diastolic dysfunction, mild-moderate MR, moderate pulmonary HTN PASP 54 mmHg.    Patient is clinically and hemodynamically stable.  Cardiac evaluation was unremarkable.  Patient appears euvolemic on exam.    From the cardiac perspective, the patient may proceed with hip surgery without the need for additional cardiac testing or risk stratification.

## 2020-12-08 NOTE — CONSULT NOTE ADULT - PROBLEM SELECTOR RECOMMENDATION 9
2/2 mechanical fall at home. orthopaedics planning for surgical intervention tomorrow PM  -Pt able to ambulate at home with no chest pain at baseline and can accomplish almost all ADLs.   -RCRI of 1-2 (hx of HF but unclear if ischemic) for orthopaedic surgery. Appears euvolemic at this time (possibly also hypovolemic).   -recent echo in chart from 3 months ago with normal EF but mild diastolic dysfunction and moderate pulm HTN  -No further cardiac workup needed prior to surgery at this time. 2/2 mechanical fall at home. Orthopedics team planning for surgical intervention  -Pt able to ambulate at home with no chest pain at baseline and can accomplish almost all ADLs.   -RCRI of 1-2 (hx of HF but unclear if ischemic) for orthopaedic surgery. Appears euvolemic at this time (possibly also hypovolemic).   -recent echocardiogram from 3 months ago with normal LVEF but mild diastolic dysfunction and moderate pulmonary HTN  -No further cardiac workup needed prior to surgery at this time -- she may proceed from cardiac standpoint without the need for additional cardiac testing or risk stratification

## 2020-12-09 NOTE — PROGRESS NOTE ADULT - SUBJECTIVE AND OBJECTIVE BOX
Pt's daughter called and the unfortunate news of her mother's death was discussed. She  at 11:08pm, intraoperatively. During the procedure, the patient developed hypercarbia/hypoxia with loss of blood pressure. The decision was made to position the patient supine and begin CPR after applying an ioband drape to the wound. Multiple rounds of CPR/defib were performed. The procedure itself went as planned without local surgical issues. Of note, a kerr catheter was placed at some point when the patient was briefly stable, with resulting visible purulence. The patient preoperatively was AOX3 and never complained of any symptoms of urinary frequency or dysuria. Family was also notified that they would not be able to come in to see her d/t COVID restrictions. Medical examiner was made aware and death certificate was signed.

## 2020-12-16 LAB — SURGICAL PATHOLOGY STUDY: SIGNIFICANT CHANGE UP

## 2021-01-13 NOTE — DISCHARGE NOTE FOR THE EXPIRED PATIENT - HOSPITAL COURSE
Pt with hx of COPD/CHF, previously ambulating without assistance, had a fall, sustaining a right FN Fx on 12/7/20. After obtaining medical and cardiac clearance, patient underwent right hip hemiarthroplasty on 12/9/20. During the procedure, the patient developed hypercarbia/hypoxia with loss of blood pressure. The decision was made to position the patient supine and begin CPR after applying an ioband drape to the wound. Multiple rounds of CPR/defib were performed. Eventually time of death was declared by anesthesia at 11:08pm due to failed resuscitation efforts. Family was notified. Medical examiner was made aware and death certificate was signed.

## 2021-02-01 ENCOUNTER — APPOINTMENT (OUTPATIENT)
Dept: PULMONOLOGY | Facility: CLINIC | Age: 81
End: 2021-02-01

## 2021-03-11 ENCOUNTER — APPOINTMENT (OUTPATIENT)
Dept: CARDIOLOGY | Facility: CLINIC | Age: 81
End: 2021-03-11

## 2024-07-03 NOTE — ED ADULT TRIAGE NOTE - ESI TRIAGE ACUITY LEVEL, MLM
3
Detail Level: Detailed
Detail Level: Zone
Detail Level: Generalized
Moisturizer Recommendations: Cerave moisturizer to apply daily.
Detail Level: Simple

## 2024-10-29 NOTE — ED PROVIDER NOTE - NS ED MD DISPO DIVISION
answering questions with patient. Pertinent POC, labs, have been reviewed.  Final examination of the patient, instructions for follow up with PCP and relevant specialists within 1-2 weeks of discharge, preparation of discharge records instructions, prescriptions, and clear identification of reasons to return to the emergency room.  /c    I have reviewed the patient's medical history in detail and updated the computerized patient record.    This note was partially generated using Dragon voice recognition system, and there may be some incorrect words, spellings, punctuation that were not noticed in checking the note before saving.      Electronically signed by: CHELSEA Acosta CNP on 10/29/2024     MERVAT

## 2025-04-27 NOTE — DIETITIAN INITIAL EVALUATION ADULT. - WEIGHT IN LBS
73 year old female with PMH of  COPD, pneumothorax, collapsed lung, and TIAs who presented to the ED at [facility] on 4/16 complaining of severe right arm weakness. Per patient she woke up on  2 days prior to admit  with difficulty moving and lifting right arm. NIHSS was 5 in ED.  CTH showed high R parietal encephalomalacia and gliosis related likely to prior infarct, age indeterminate L CR, old BG L lacunar infarct, L thalamic lacunar infarct.  She was not a candidate for IV thrombolytics because she was out of the window. CTA was neg for LVO.  MRI brain this morning reveals acute L infarct in the corona radiate. She was placed on DAPT. Course complicated by elevated LFTs with workup unrevealing. She was evaluated for admission to acute inpatient rehab and admitted to [facility] on 4/18/25.     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          104.2